# Patient Record
Sex: FEMALE | Race: WHITE | NOT HISPANIC OR LATINO
[De-identification: names, ages, dates, MRNs, and addresses within clinical notes are randomized per-mention and may not be internally consistent; named-entity substitution may affect disease eponyms.]

---

## 2016-11-17 RX ORDER — VERAPAMIL HCL 240 MG
1 CAPSULE, EXTENDED RELEASE PELLETS 24 HR ORAL
Qty: 0 | Refills: 0 | COMMUNITY
Start: 2016-11-17

## 2017-05-26 ENCOUNTER — APPOINTMENT (OUTPATIENT)
Dept: UROLOGY | Facility: CLINIC | Age: 82
End: 2017-05-26

## 2017-06-05 ENCOUNTER — APPOINTMENT (OUTPATIENT)
Dept: UROLOGY | Facility: CLINIC | Age: 82
End: 2017-06-05

## 2017-06-05 VITALS
HEART RATE: 69 BPM | RESPIRATION RATE: 17 BRPM | DIASTOLIC BLOOD PRESSURE: 76 MMHG | HEIGHT: 62 IN | BODY MASS INDEX: 30.36 KG/M2 | SYSTOLIC BLOOD PRESSURE: 120 MMHG | TEMPERATURE: 97.8 F | WEIGHT: 165 LBS

## 2017-06-05 DIAGNOSIS — Z78.9 OTHER SPECIFIED HEALTH STATUS: ICD-10-CM

## 2017-06-05 DIAGNOSIS — N81.3 COMPLETE UTEROVAGINAL PROLAPSE: ICD-10-CM

## 2017-07-24 ENCOUNTER — APPOINTMENT (OUTPATIENT)
Dept: OBGYN | Facility: CLINIC | Age: 82
End: 2017-07-24

## 2017-07-24 VITALS
HEIGHT: 62 IN | SYSTOLIC BLOOD PRESSURE: 120 MMHG | DIASTOLIC BLOOD PRESSURE: 70 MMHG | HEART RATE: 65 BPM | WEIGHT: 165 LBS | BODY MASS INDEX: 30.36 KG/M2

## 2017-07-24 DIAGNOSIS — R35.1 NOCTURIA: ICD-10-CM

## 2017-07-24 DIAGNOSIS — N95.2 POSTMENOPAUSAL ATROPHIC VAGINITIS: ICD-10-CM

## 2017-07-24 DIAGNOSIS — N39.46 MIXED INCONTINENCE: ICD-10-CM

## 2017-07-24 DIAGNOSIS — R32 UNSPECIFIED URINARY INCONTINENCE: ICD-10-CM

## 2017-07-24 DIAGNOSIS — N81.4 UTEROVAGINAL PROLAPSE, UNSPECIFIED: ICD-10-CM

## 2017-08-18 ENCOUNTER — APPOINTMENT (OUTPATIENT)
Dept: OBGYN | Facility: CLINIC | Age: 82
End: 2017-08-18

## 2017-10-18 ENCOUNTER — EMERGENCY (EMERGENCY)
Facility: HOSPITAL | Age: 82
LOS: 1 days | Discharge: HOME | End: 2017-10-18
Attending: EMERGENCY MEDICINE | Admitting: EMERGENCY MEDICINE
Payer: COMMERCIAL

## 2017-10-18 VITALS
SYSTOLIC BLOOD PRESSURE: 103 MMHG | RESPIRATION RATE: 14 BRPM | HEIGHT: 61 IN | OXYGEN SATURATION: 95 % | WEIGHT: 164.91 LBS | DIASTOLIC BLOOD PRESSURE: 58 MMHG | HEART RATE: 70 BPM | TEMPERATURE: 98 F

## 2017-10-18 VITALS
HEART RATE: 69 BPM | SYSTOLIC BLOOD PRESSURE: 140 MMHG | OXYGEN SATURATION: 96 % | DIASTOLIC BLOOD PRESSURE: 71 MMHG | RESPIRATION RATE: 15 BRPM | TEMPERATURE: 98 F

## 2017-10-18 DIAGNOSIS — E78.00 PURE HYPERCHOLESTEROLEMIA, UNSPECIFIED: ICD-10-CM

## 2017-10-18 DIAGNOSIS — I10 ESSENTIAL (PRIMARY) HYPERTENSION: ICD-10-CM

## 2017-10-18 DIAGNOSIS — R41.0 DISORIENTATION, UNSPECIFIED: ICD-10-CM

## 2017-10-18 DIAGNOSIS — N39.0 URINARY TRACT INFECTION, SITE NOT SPECIFIED: ICD-10-CM

## 2017-10-18 DIAGNOSIS — Z88.2 ALLERGY STATUS TO SULFONAMIDES: ICD-10-CM

## 2017-10-18 DIAGNOSIS — F03.90 UNSPECIFIED DEMENTIA WITHOUT BEHAVIORAL DISTURBANCE: ICD-10-CM

## 2017-10-18 LAB
ALBUMIN SERPL ELPH-MCNC: 3.3 G/DL — SIGNIFICANT CHANGE UP (ref 3.3–5)
ALP SERPL-CCNC: 73 U/L — SIGNIFICANT CHANGE UP (ref 40–120)
ALT FLD-CCNC: 13 U/L — SIGNIFICANT CHANGE UP (ref 12–78)
ANION GAP SERPL CALC-SCNC: 9 MMOL/L — SIGNIFICANT CHANGE UP (ref 5–17)
APPEARANCE UR: CLEAR — SIGNIFICANT CHANGE UP
APTT BLD: 29.8 SEC — SIGNIFICANT CHANGE UP (ref 27.5–37.4)
AST SERPL-CCNC: 22 U/L — SIGNIFICANT CHANGE UP (ref 15–37)
BACTERIA # UR AUTO: ABNORMAL
BASOPHILS # BLD AUTO: 0.1 K/UL — SIGNIFICANT CHANGE UP (ref 0–0.2)
BASOPHILS NFR BLD AUTO: 0.8 % — SIGNIFICANT CHANGE UP (ref 0–2)
BILIRUB SERPL-MCNC: 0.3 MG/DL — SIGNIFICANT CHANGE UP (ref 0.2–1.2)
BILIRUB UR-MCNC: NEGATIVE — SIGNIFICANT CHANGE UP
BUN SERPL-MCNC: 45 MG/DL — HIGH (ref 7–23)
CALCIUM SERPL-MCNC: 9.5 MG/DL — SIGNIFICANT CHANGE UP (ref 8.5–10.1)
CHLORIDE SERPL-SCNC: 104 MMOL/L — SIGNIFICANT CHANGE UP (ref 96–108)
CO2 SERPL-SCNC: 27 MMOL/L — SIGNIFICANT CHANGE UP (ref 22–31)
COLOR SPEC: YELLOW — SIGNIFICANT CHANGE UP
CREAT SERPL-MCNC: 1.9 MG/DL — HIGH (ref 0.5–1.3)
DIFF PNL FLD: ABNORMAL
EOSINOPHIL # BLD AUTO: 0.3 K/UL — SIGNIFICANT CHANGE UP (ref 0–0.5)
EOSINOPHIL NFR BLD AUTO: 2.6 % — SIGNIFICANT CHANGE UP (ref 0–6)
EPI CELLS # UR: SIGNIFICANT CHANGE UP
GLUCOSE SERPL-MCNC: 101 MG/DL — HIGH (ref 70–99)
GLUCOSE UR QL: NEGATIVE — SIGNIFICANT CHANGE UP
HCT VFR BLD CALC: 37.3 % — SIGNIFICANT CHANGE UP (ref 34.5–45)
HGB BLD-MCNC: 11.9 G/DL — SIGNIFICANT CHANGE UP (ref 11.5–15.5)
INR BLD: 1.04 RATIO — SIGNIFICANT CHANGE UP (ref 0.88–1.16)
KETONES UR-MCNC: NEGATIVE — SIGNIFICANT CHANGE UP
LACTATE SERPL-SCNC: 1.6 MMOL/L — SIGNIFICANT CHANGE UP (ref 0.7–2)
LEUKOCYTE ESTERASE UR-ACNC: ABNORMAL
LYMPHOCYTES # BLD AUTO: 2.9 K/UL — SIGNIFICANT CHANGE UP (ref 1–3.3)
LYMPHOCYTES # BLD AUTO: 27.1 % — SIGNIFICANT CHANGE UP (ref 13–44)
MCHC RBC-ENTMCNC: 30.8 PG — SIGNIFICANT CHANGE UP (ref 27–34)
MCHC RBC-ENTMCNC: 31.9 GM/DL — LOW (ref 32–36)
MCV RBC AUTO: 96.4 FL — SIGNIFICANT CHANGE UP (ref 80–100)
MONOCYTES # BLD AUTO: 1.2 K/UL — HIGH (ref 0–0.9)
MONOCYTES NFR BLD AUTO: 11.2 % — HIGH (ref 1–9)
NEUTROPHILS # BLD AUTO: 6.2 K/UL — SIGNIFICANT CHANGE UP (ref 1.8–7.4)
NEUTROPHILS NFR BLD AUTO: 58.3 % — SIGNIFICANT CHANGE UP (ref 43–77)
NITRITE UR-MCNC: NEGATIVE — SIGNIFICANT CHANGE UP
PH UR: 7 — SIGNIFICANT CHANGE UP (ref 5–8)
PLATELET # BLD AUTO: 312 K/UL — SIGNIFICANT CHANGE UP (ref 150–400)
POTASSIUM SERPL-MCNC: 4.4 MMOL/L — SIGNIFICANT CHANGE UP (ref 3.5–5.3)
POTASSIUM SERPL-SCNC: 4.4 MMOL/L — SIGNIFICANT CHANGE UP (ref 3.5–5.3)
PROT SERPL-MCNC: 7 G/DL — SIGNIFICANT CHANGE UP (ref 6–8.3)
PROT UR-MCNC: 25 MG/DL
PROTHROM AB SERPL-ACNC: 11.4 SEC — SIGNIFICANT CHANGE UP (ref 9.8–12.7)
RBC # BLD: 3.87 M/UL — SIGNIFICANT CHANGE UP (ref 3.8–5.2)
RBC # FLD: 13.9 % — SIGNIFICANT CHANGE UP (ref 10.3–14.5)
RBC CASTS # UR COMP ASSIST: SIGNIFICANT CHANGE UP /HPF (ref 0–4)
SODIUM SERPL-SCNC: 140 MMOL/L — SIGNIFICANT CHANGE UP (ref 135–145)
SP GR SPEC: 1.01 — SIGNIFICANT CHANGE UP (ref 1.01–1.02)
UROBILINOGEN FLD QL: NEGATIVE — SIGNIFICANT CHANGE UP
WBC # BLD: 10.6 K/UL — HIGH (ref 3.8–10.5)
WBC # FLD AUTO: 10.6 K/UL — HIGH (ref 3.8–10.5)
WBC UR QL: ABNORMAL

## 2017-10-18 PROCEDURE — 83605 ASSAY OF LACTIC ACID: CPT

## 2017-10-18 PROCEDURE — 99284 EMERGENCY DEPT VISIT MOD MDM: CPT | Mod: 25

## 2017-10-18 PROCEDURE — 85027 COMPLETE CBC AUTOMATED: CPT

## 2017-10-18 PROCEDURE — 80053 COMPREHEN METABOLIC PANEL: CPT

## 2017-10-18 PROCEDURE — 85730 THROMBOPLASTIN TIME PARTIAL: CPT

## 2017-10-18 PROCEDURE — 87150 DNA/RNA AMPLIFIED PROBE: CPT

## 2017-10-18 PROCEDURE — 93005 ELECTROCARDIOGRAM TRACING: CPT

## 2017-10-18 PROCEDURE — 96365 THER/PROPH/DIAG IV INF INIT: CPT

## 2017-10-18 PROCEDURE — 85610 PROTHROMBIN TIME: CPT

## 2017-10-18 PROCEDURE — 71045 X-RAY EXAM CHEST 1 VIEW: CPT

## 2017-10-18 PROCEDURE — 87040 BLOOD CULTURE FOR BACTERIA: CPT

## 2017-10-18 PROCEDURE — 87086 URINE CULTURE/COLONY COUNT: CPT

## 2017-10-18 PROCEDURE — 99285 EMERGENCY DEPT VISIT HI MDM: CPT

## 2017-10-18 PROCEDURE — 81001 URINALYSIS AUTO W/SCOPE: CPT

## 2017-10-18 PROCEDURE — 96361 HYDRATE IV INFUSION ADD-ON: CPT

## 2017-10-18 PROCEDURE — 87186 SC STD MICRODIL/AGAR DIL: CPT

## 2017-10-18 PROCEDURE — 71010: CPT | Mod: 26

## 2017-10-18 RX ORDER — CEFUROXIME AXETIL 250 MG
1 TABLET ORAL
Qty: 14 | Refills: 0 | OUTPATIENT
Start: 2017-10-18 | End: 2017-10-25

## 2017-10-18 RX ORDER — SODIUM CHLORIDE 9 MG/ML
1000 INJECTION INTRAMUSCULAR; INTRAVENOUS; SUBCUTANEOUS ONCE
Qty: 0 | Refills: 0 | Status: COMPLETED | OUTPATIENT
Start: 2017-10-18 | End: 2017-10-18

## 2017-10-18 RX ORDER — CEFTRIAXONE 500 MG/1
1 INJECTION, POWDER, FOR SOLUTION INTRAMUSCULAR; INTRAVENOUS ONCE
Qty: 0 | Refills: 0 | Status: COMPLETED | OUTPATIENT
Start: 2017-10-18 | End: 2017-10-18

## 2017-10-18 RX ADMIN — SODIUM CHLORIDE 1000 MILLILITER(S): 9 INJECTION INTRAMUSCULAR; INTRAVENOUS; SUBCUTANEOUS at 18:39

## 2017-10-18 RX ADMIN — SODIUM CHLORIDE 1000 MILLILITER(S): 9 INJECTION INTRAMUSCULAR; INTRAVENOUS; SUBCUTANEOUS at 16:30

## 2017-10-18 RX ADMIN — CEFTRIAXONE 100 GRAM(S): 500 INJECTION, POWDER, FOR SOLUTION INTRAMUSCULAR; INTRAVENOUS at 20:17

## 2017-10-18 NOTE — ED PROVIDER NOTE - OBJECTIVE STATEMENT
pt with hx dementia and baseline confusion bib daughter for increased confusion which pt usually has when uti. no fevers, vomiting, cough, diarrhea. pt unable to provide further history secondary to dementia.  pmd - [facility]

## 2017-10-18 NOTE — ED PROVIDER NOTE - CARE PLAN
Principal Discharge DX:	Confusion Principal Discharge DX:	Confusion  Secondary Diagnosis:	UTI (urinary tract infection)

## 2017-10-18 NOTE — ED ADULT NURSE NOTE - OBJECTIVE STATEMENT
patient came in ED with c/o increased confusion noted X yesterday. afebrile. non-labored respiration noted. abdomen nondistended, nontender. patient noted with prolapsed cervix.

## 2017-10-18 NOTE — ED PROVIDER NOTE - PROGRESS NOTE DETAILS
Reevaluated patient at bedside.  Patient feeling well.  Discussed the results of all diagnostic testing in ED and copies of all reports given to family, they declining admission, wants pt to be d/c home and f/u with pmd tomorrow and repeat labs as outpt.   An opportunity to ask questions was given.  Discussed the importance of prompt, close medical follow-up.  Patient will return with any changes, concerns or persistent / worsening symptoms.  Understanding of all instructions verbalized.

## 2017-10-18 NOTE — ED ADULT TRIAGE NOTE - CHIEF COMPLAINT QUOTE
"She has been more confused than usual."  pt has history of dementia, per daughter, confusion has been much worse the past few days, daughter states "usually this means she has a UTI", pt also more tired than usual

## 2017-10-18 NOTE — ED ADULT NURSE NOTE - CHPI ED SYMPTOMS NEG
no chills/no decreased eating/drinking/no dizziness/no fever/no numbness/no pain/no weakness/no vomiting/no nausea

## 2017-10-20 LAB
-  COAGULASE NEGATIVE STAPHYLOCOCCUS: SIGNIFICANT CHANGE UP
CULTURE RESULTS: SIGNIFICANT CHANGE UP
GRAM STN FLD: SIGNIFICANT CHANGE UP
GRAM STN FLD: SIGNIFICANT CHANGE UP
METHOD TYPE: SIGNIFICANT CHANGE UP
ORGANISM # SPEC MICROSCOPIC CNT: SIGNIFICANT CHANGE UP
ORGANISM # SPEC MICROSCOPIC CNT: SIGNIFICANT CHANGE UP
SPECIMEN SOURCE: SIGNIFICANT CHANGE UP

## 2017-10-20 NOTE — PROVIDER CONTACT NOTE (CRITICAL VALUE NOTIFICATION) - NAME OF MD/NP/PA/DO NOTIFIED:
Quality 358: Patient-Centered Surgical Risk Assessment And Communication: Documentation of patient-specific risk assessment with a risk calculator based on multi-institutional clinical data, the specific risk calculator used, and communication of risk assessment from risk calculator with the patient or family.
Detail Level: Generalized
Dr Zhou

## 2017-10-20 NOTE — PROVIDER CONTACT NOTE (CRITICAL VALUE NOTIFICATION) - ACTION/TREATMENT ORDERED:
As per Dr Zhou, patient went home on antibiotics and does not need to be called now, may be called in AM

## 2017-10-23 ENCOUNTER — INPATIENT (INPATIENT)
Facility: HOSPITAL | Age: 82
LOS: 3 days | Discharge: NURSING, REHAB OR LONG-TERM CARE | DRG: 371 | End: 2017-10-27
Attending: HOSPITALIST | Admitting: HOSPITALIST
Payer: COMMERCIAL

## 2017-10-23 VITALS
TEMPERATURE: 100 F | DIASTOLIC BLOOD PRESSURE: 71 MMHG | SYSTOLIC BLOOD PRESSURE: 102 MMHG | OXYGEN SATURATION: 96 % | RESPIRATION RATE: 17 BRPM | HEIGHT: 61 IN | WEIGHT: 164.91 LBS | HEART RATE: 73 BPM

## 2017-10-23 DIAGNOSIS — I10 ESSENTIAL (PRIMARY) HYPERTENSION: ICD-10-CM

## 2017-10-23 DIAGNOSIS — E78.5 HYPERLIPIDEMIA, UNSPECIFIED: ICD-10-CM

## 2017-10-23 DIAGNOSIS — K51.00 ULCERATIVE (CHRONIC) PANCOLITIS WITHOUT COMPLICATIONS: ICD-10-CM

## 2017-10-23 DIAGNOSIS — Z29.9 ENCOUNTER FOR PROPHYLACTIC MEASURES, UNSPECIFIED: ICD-10-CM

## 2017-10-23 DIAGNOSIS — N17.9 ACUTE KIDNEY FAILURE, UNSPECIFIED: ICD-10-CM

## 2017-10-23 DIAGNOSIS — N39.0 URINARY TRACT INFECTION, SITE NOT SPECIFIED: ICD-10-CM

## 2017-10-23 DIAGNOSIS — G30.9 ALZHEIMER'S DISEASE, UNSPECIFIED: ICD-10-CM

## 2017-10-23 DIAGNOSIS — R41.82 ALTERED MENTAL STATUS, UNSPECIFIED: ICD-10-CM
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
## 2017-10-23 LAB
ALBUMIN SERPL ELPH-MCNC: 2.6 G/DL — LOW (ref 3.3–5)
ALP SERPL-CCNC: 74 U/L — SIGNIFICANT CHANGE UP (ref 40–120)
ALT FLD-CCNC: 12 U/L — SIGNIFICANT CHANGE UP (ref 12–78)
AMYLASE P1 CFR SERPL: 22 U/L — LOW (ref 25–115)
ANION GAP SERPL CALC-SCNC: 7 MMOL/L — SIGNIFICANT CHANGE UP (ref 5–17)
APPEARANCE UR: ABNORMAL
AST SERPL-CCNC: 31 U/L — SIGNIFICANT CHANGE UP (ref 15–37)
BILIRUB SERPL-MCNC: 0.6 MG/DL — SIGNIFICANT CHANGE UP (ref 0.2–1.2)
BILIRUB UR-MCNC: NEGATIVE — SIGNIFICANT CHANGE UP
BUN SERPL-MCNC: 42 MG/DL — HIGH (ref 7–23)
CALCIUM SERPL-MCNC: 8.5 MG/DL — SIGNIFICANT CHANGE UP (ref 8.5–10.1)
CHLORIDE SERPL-SCNC: 103 MMOL/L — SIGNIFICANT CHANGE UP (ref 96–108)
CO2 SERPL-SCNC: 25 MMOL/L — SIGNIFICANT CHANGE UP (ref 22–31)
COLOR SPEC: YELLOW — SIGNIFICANT CHANGE UP
CREAT SERPL-MCNC: 1.9 MG/DL — HIGH (ref 0.5–1.3)
CULTURE RESULTS: SIGNIFICANT CHANGE UP
DIFF PNL FLD: ABNORMAL
GLUCOSE SERPL-MCNC: 106 MG/DL — HIGH (ref 70–99)
GLUCOSE UR QL: NEGATIVE — SIGNIFICANT CHANGE UP
HCT VFR BLD CALC: 36 % — SIGNIFICANT CHANGE UP (ref 34.5–45)
HGB BLD-MCNC: 11.5 G/DL — SIGNIFICANT CHANGE UP (ref 11.5–15.5)
KETONES UR-MCNC: NEGATIVE — SIGNIFICANT CHANGE UP
LACTATE SERPL-SCNC: 1.2 MMOL/L — SIGNIFICANT CHANGE UP (ref 0.7–2)
LEUKOCYTE ESTERASE UR-ACNC: ABNORMAL
LIDOCAIN IGE QN: 80 U/L — SIGNIFICANT CHANGE UP (ref 73–393)
LYMPHOCYTES # BLD AUTO: 10 % — LOW (ref 13–44)
MCHC RBC-ENTMCNC: 30.6 PG — SIGNIFICANT CHANGE UP (ref 27–34)
MCHC RBC-ENTMCNC: 32 GM/DL — SIGNIFICANT CHANGE UP (ref 32–36)
MCV RBC AUTO: 95.8 FL — SIGNIFICANT CHANGE UP (ref 80–100)
MONOCYTES NFR BLD AUTO: 13 % — HIGH (ref 1–9)
NEUTROPHILS NFR BLD AUTO: 76 % — SIGNIFICANT CHANGE UP (ref 43–77)
NITRITE UR-MCNC: NEGATIVE — SIGNIFICANT CHANGE UP
PH UR: 7 — SIGNIFICANT CHANGE UP (ref 5–8)
PLATELET # BLD AUTO: 283 K/UL — SIGNIFICANT CHANGE UP (ref 150–400)
POTASSIUM SERPL-MCNC: 3.5 MMOL/L — SIGNIFICANT CHANGE UP (ref 3.5–5.3)
POTASSIUM SERPL-SCNC: 3.5 MMOL/L — SIGNIFICANT CHANGE UP (ref 3.5–5.3)
PROT SERPL-MCNC: 6.6 G/DL — SIGNIFICANT CHANGE UP (ref 6–8.3)
PROT UR-MCNC: 75 MG/DL
RBC # BLD: 3.76 M/UL — LOW (ref 3.8–5.2)
RBC # FLD: 13.6 % — SIGNIFICANT CHANGE UP (ref 10.3–14.5)
SODIUM SERPL-SCNC: 135 MMOL/L — SIGNIFICANT CHANGE UP (ref 135–145)
SP GR SPEC: 1 — LOW (ref 1.01–1.02)
SPECIMEN SOURCE: SIGNIFICANT CHANGE UP
UROBILINOGEN FLD QL: NEGATIVE — SIGNIFICANT CHANGE UP
WBC # BLD: 25.4 K/UL — HIGH (ref 3.8–10.5)
WBC # FLD AUTO: 25.4 K/UL — HIGH (ref 3.8–10.5)

## 2017-10-23 PROCEDURE — 72170 X-RAY EXAM OF PELVIS: CPT | Mod: 26

## 2017-10-23 PROCEDURE — 71010: CPT | Mod: 26

## 2017-10-23 PROCEDURE — 99223 1ST HOSP IP/OBS HIGH 75: CPT | Mod: AI

## 2017-10-23 PROCEDURE — 93010 ELECTROCARDIOGRAM REPORT: CPT

## 2017-10-23 PROCEDURE — 74176 CT ABD & PELVIS W/O CONTRAST: CPT | Mod: 26

## 2017-10-23 PROCEDURE — 99281 EMR DPT VST MAYX REQ PHY/QHP: CPT

## 2017-10-23 PROCEDURE — 70450 CT HEAD/BRAIN W/O DYE: CPT | Mod: 26

## 2017-10-23 RX ORDER — SODIUM CHLORIDE 9 MG/ML
1000 INJECTION INTRAMUSCULAR; INTRAVENOUS; SUBCUTANEOUS
Qty: 0 | Refills: 0 | Status: DISCONTINUED | OUTPATIENT
Start: 2017-10-23 | End: 2017-10-27

## 2017-10-23 RX ORDER — ASPIRIN/CALCIUM CARB/MAGNESIUM 324 MG
81 TABLET ORAL DAILY
Qty: 0 | Refills: 0 | Status: DISCONTINUED | OUTPATIENT
Start: 2017-10-23 | End: 2017-10-27

## 2017-10-23 RX ORDER — SODIUM CHLORIDE 9 MG/ML
1000 INJECTION INTRAMUSCULAR; INTRAVENOUS; SUBCUTANEOUS ONCE
Qty: 0 | Refills: 0 | Status: COMPLETED | OUTPATIENT
Start: 2017-10-23 | End: 2017-10-23

## 2017-10-23 RX ORDER — LACTOBACILLUS ACIDOPHILUS 100MM CELL
1 CAPSULE ORAL DAILY
Qty: 0 | Refills: 0 | Status: DISCONTINUED | OUTPATIENT
Start: 2017-10-23 | End: 2017-10-27

## 2017-10-23 RX ORDER — VANCOMYCIN HCL 1 G
125 VIAL (EA) INTRAVENOUS EVERY 6 HOURS
Qty: 0 | Refills: 0 | Status: DISCONTINUED | OUTPATIENT
Start: 2017-10-23 | End: 2017-10-27

## 2017-10-23 RX ORDER — NITROFURANTOIN MACROCRYSTAL 50 MG
100 CAPSULE ORAL ONCE
Qty: 0 | Refills: 0 | Status: DISCONTINUED | OUTPATIENT
Start: 2017-10-23 | End: 2017-10-23

## 2017-10-23 RX ORDER — SIMVASTATIN 20 MG/1
20 TABLET, FILM COATED ORAL AT BEDTIME
Qty: 0 | Refills: 0 | Status: DISCONTINUED | OUTPATIENT
Start: 2017-10-23 | End: 2017-10-27

## 2017-10-23 RX ORDER — RISPERIDONE 4 MG/1
0.25 TABLET ORAL AT BEDTIME
Qty: 0 | Refills: 0 | Status: DISCONTINUED | OUTPATIENT
Start: 2017-10-23 | End: 2017-10-27

## 2017-10-23 RX ORDER — CIPROFLOXACIN LACTATE 400MG/40ML
200 VIAL (ML) INTRAVENOUS ONCE
Qty: 0 | Refills: 0 | Status: COMPLETED | OUTPATIENT
Start: 2017-10-23 | End: 2017-10-23

## 2017-10-23 RX ORDER — PIPERACILLIN AND TAZOBACTAM 4; .5 G/20ML; G/20ML
3.38 INJECTION, POWDER, LYOPHILIZED, FOR SOLUTION INTRAVENOUS ONCE
Qty: 0 | Refills: 0 | Status: COMPLETED | OUTPATIENT
Start: 2017-10-23 | End: 2017-10-23

## 2017-10-23 RX ORDER — PIPERACILLIN AND TAZOBACTAM 4; .5 G/20ML; G/20ML
3.38 INJECTION, POWDER, LYOPHILIZED, FOR SOLUTION INTRAVENOUS EVERY 8 HOURS
Qty: 0 | Refills: 0 | Status: DISCONTINUED | OUTPATIENT
Start: 2017-10-24 | End: 2017-10-24

## 2017-10-23 RX ORDER — ASPIRIN/CALCIUM CARB/MAGNESIUM 324 MG
81 TABLET ORAL DAILY
Qty: 0 | Refills: 0 | Status: DISCONTINUED | OUTPATIENT
Start: 2017-10-23 | End: 2017-10-23

## 2017-10-23 RX ORDER — SODIUM CHLORIDE 9 MG/ML
3 INJECTION INTRAMUSCULAR; INTRAVENOUS; SUBCUTANEOUS ONCE
Qty: 0 | Refills: 0 | Status: COMPLETED | OUTPATIENT
Start: 2017-10-23 | End: 2017-10-23

## 2017-10-23 RX ORDER — PANTOPRAZOLE SODIUM 20 MG/1
40 TABLET, DELAYED RELEASE ORAL
Qty: 0 | Refills: 0 | Status: DISCONTINUED | OUTPATIENT
Start: 2017-10-23 | End: 2017-10-24

## 2017-10-23 RX ORDER — ESCITALOPRAM OXALATE 10 MG/1
20 TABLET, FILM COATED ORAL DAILY
Qty: 0 | Refills: 0 | Status: DISCONTINUED | OUTPATIENT
Start: 2017-10-23 | End: 2017-10-27

## 2017-10-23 RX ORDER — METRONIDAZOLE 500 MG
500 TABLET ORAL ONCE
Qty: 0 | Refills: 0 | Status: COMPLETED | OUTPATIENT
Start: 2017-10-23 | End: 2017-10-23

## 2017-10-23 RX ORDER — HEPARIN SODIUM 5000 [USP'U]/ML
5000 INJECTION INTRAVENOUS; SUBCUTANEOUS EVERY 12 HOURS
Qty: 0 | Refills: 0 | Status: DISCONTINUED | OUTPATIENT
Start: 2017-10-23 | End: 2017-10-27

## 2017-10-23 RX ADMIN — PIPERACILLIN AND TAZOBACTAM 200 GRAM(S): 4; .5 INJECTION, POWDER, LYOPHILIZED, FOR SOLUTION INTRAVENOUS at 21:00

## 2017-10-23 RX ADMIN — Medication 100 MILLIGRAM(S): at 18:15

## 2017-10-23 RX ADMIN — SODIUM CHLORIDE 1000 MILLILITER(S): 9 INJECTION INTRAMUSCULAR; INTRAVENOUS; SUBCUTANEOUS at 15:12

## 2017-10-23 RX ADMIN — SODIUM CHLORIDE 1000 MILLILITER(S): 9 INJECTION INTRAMUSCULAR; INTRAVENOUS; SUBCUTANEOUS at 14:26

## 2017-10-23 RX ADMIN — Medication 100 MILLIGRAM(S): at 19:36

## 2017-10-23 RX ADMIN — SIMVASTATIN 20 MILLIGRAM(S): 20 TABLET, FILM COATED ORAL at 22:08

## 2017-10-23 RX ADMIN — SODIUM CHLORIDE 3 MILLILITER(S): 9 INJECTION INTRAMUSCULAR; INTRAVENOUS; SUBCUTANEOUS at 14:25

## 2017-10-23 NOTE — H&P ADULT - ASSESSMENT
86 y/o F with PMH of dementia, UTI's, cdif in 2016, bladder prolapse being treated for UTI p/w several episodes of diarrhea with fall with AMS, severe leukocytosis, AMS, NATALI, hypoalbuminemia, pancolitis on CT admitted for pancolitis, metabolic encephalopathy. 86 y/o F with PMH of dementia, HTN, HLD, UTI's, cdif in 2016, bladder prolapse being treated for UTI p/w several episodes of diarrhea with fall with AMS, severe leukocytosis with bandemia, elevated creatinine, hypoalbuminemia, pancolitis on CT admitted for pancolitis, NATALI, metabolic encephalopathy and fall workup.

## 2017-10-23 NOTE — H&P ADULT - PROBLEM SELECTOR PLAN 8
A and O x2, not agitated  c/w risperidone 0.25mg qhs (home med) A and O x2, not agitated  c/w risperidone 0.25mg qhs (home med)  c/w lexapro 20mg daily

## 2017-10-23 NOTE — H&P ADULT - PROBLEM SELECTOR PLAN 5
likely pre-renal in setting of UTI, colitis, diarrhea on diuretic at home  s/p 2L NS bolus in ER  c/w NS @ 60cc/hr  monitor electrolytes and repletes as needed likely pre-renal in setting of UTI, colitis, diarrhea on diuretic at home  s/p 2L NS bolus in ER  c/w NS @ 60cc/hr  monitor creatinine, electrolytes and repletes as needed

## 2017-10-23 NOTE — H&P ADULT - PROBLEM SELECTOR PLAN 4
-technically UTI should be resolved as been on PO ceftin x 5 days with coli only resistant to quinolones  -could not get clean UA or urine cx -technically UTI should be resolved as been on PO ceftin x 5 days with coli only resistant to quinolones  -UA with moderate LE  -zosyn will cover UTI  -f/u new urine cx -technically UTI should be resolved as been on PO ceftin x 5 days with Ecoli only resistant to quinolones  -UA with moderate LE, wbc, bacteria  -IV zosyn will cover UTI   -f/u new urine cx

## 2017-10-23 NOTE — H&P ADULT - HISTORY OF PRESENT ILLNESS
86 y/o F with PMH of dementia, UTI's, cdif in 2016, bladder prolapse p/w several epsidoes of diarrhea. Was recently seen on [facility] in ER on 10/18, diagnose with UTI, sent from ER with PO ceftin with urine cx  growing Ecoli resistant to quinolones.      ED vitals: 99.8, 73, 102/71, 17, 96% on Ra  She was given IV cipro 200mg, IV flagyl 500mg, and 2L NS bolus in ER Majority of Hx obtained from daughter    88 y/o F with PMH of dementia, UTI's, cdif in 2016, bladder prolapse currently being treated for UTI p/w several episodes of diarrhea x 2 days. Since yesterday, family reports >10 episodes of nonbloody,  watery diarrhea. No melena. Was recently seen on [facility] ER on 10/18, diagnosed with UTI, sent from ER with PO ceftin with urine cx growing Ecoli resistant to quinolones.  No reported fevers, vomiting. Associated with feeling weak and lethargy since last night as patient couldn't walk. Family reports mechanical fall last night (lives with her ) but currently and prior patient denies any pain anywhere.  Since arrival to ER, family reports patient is more arousalable and talkative compared to yesterday .No sick contacts or travel history. Pt denies any chest pain, sob, headache, palpitations, dysuria, cough.      ED vitals: 99.8, 73, 102/71, 17, 96% on Ra  She was given IV cipro 200mg, IV flagyl 500mg, and 2L NS bolus in ER Majority of Hx obtained from daughter    86 y/o F with PMH of dementia, HTN, HLD, UTI's, cdif in 2016, bladder prolapse currently being treated for UTI p/w several episodes of diarrhea x 2 days. Since yesterday, family reports >10 episodes of nonbloody,  watery diarrhea. No melena. Was recently seen on [facility] ER on 10/18, diagnosed with UTI, sent from ER with PO ceftin with urine cx growing Ecoli resistant to quinolones.  No reported fevers, vomiting. Associated with feeling weak and lethargy since last night as patient couldn't walk. Family reports mechanical fall last night (lives with her ) but currently and prior patient denies any pain anywhere.  Since arrival to ER, family reports patient is more arousalable and talkative compared to yesterday .No sick contacts or travel history. Pt denies any chest pain, sob, headache, palpitations, dysuria, cough.      ED vitals: 99.8, 73, 102/71, 17, 96% on Ra  She was given IV cipro 200mg, IV flagyl 500mg, and 2L NS bolus in ER

## 2017-10-23 NOTE — ED ADULT NURSE NOTE - OBJECTIVE STATEMENT
Pt. received alert and oriented x4 with chief complaint of increased AMS over last week w/ multiple bouts of diarrhea in last 24 hours. Pt. presents w/ stage 1 pressure ulcer to bilateral gluteal and sacral area. Pt. presents w/ prolapsed bladder.

## 2017-10-23 NOTE — H&P ADULT - PROBLEM SELECTOR PLAN 7
SBP in low 100's in setting of diarrhea to infection, dehydration, poor po intake  -will hold anti-htn at this time as, can restart as clinically indicated  -on diltizem 120mg ER, valsartan/hctz 160/25mg, and hydralazine 10mg qhs (on hold)

## 2017-10-23 NOTE — ED PROVIDER NOTE - OBJECTIVE STATEMENT
Pt is a 86 yo female who presents to the ED with worsening confusion.  PMHx of dementia, HLD, prolapsed bladder s/p failed surgical repair with no further operative management, HTN, h/o MDR UTI, h/o C.diff colitis Nov 2016.  Pt suffers from dementia and is unable to provide history.  Per family at bedside the week of 10/18 pt was noted to have increased confusion from baseline.  When this occurs pt normally has an infection.  She was seen in the ED and diagnosed with a UTI.  Pt was discharged home on po Cefuroxime.  Despite taking this medication pt continues to experience worsening confusion and last night developed diarrhea.  She is having multiple episodes of loose watery stools.  Today just before arrival to the ED pt had 5 episodes of watery diarrhea.  Family reports that this is non-bloody, non melanotic.  The family further reports that last night she was so weak she collapsed to the ground but she did not strike her head and there was not LOC.  Denies fever, chills. N/V, CP, SOB, abd pain.

## 2017-10-23 NOTE — H&P ADULT - PROBLEM SELECTOR PROBLEM 8
Need for prophylactic measure Alzheimer's dementia without behavioral disturbance, unspecified timing of dementia onset

## 2017-10-23 NOTE — ED PROVIDER NOTE - MEDICAL DECISION MAKING DETAILS
screening septic work up, EKG, chest x-ray, cardiac enzymes, CT abd/pelvis, C-diff, stool for culture, ova, parasite

## 2017-10-23 NOTE — H&P ADULT - PROBLEM SELECTOR PLAN 6
reported mechanical fall, no clear injury on exam, no pain per patient  will get xray of hip to r/o fracture  ct head r/o bleed  send ck levels to r/o rhabdo  pt eval  fall risk

## 2017-10-23 NOTE — ED PROVIDER NOTE - CARE PLAN
Principal Discharge DX:	Pancolitis  Instructions for follow-up, activity and diet:	admit  Secondary Diagnosis:	Bandemia  Secondary Diagnosis:	Renal insufficiency

## 2017-10-23 NOTE — ED PROVIDER NOTE - GENITOURINARY BLADDER
non-distended/non-tender/prolapsed bladder manual reduction attempted at bedside but once retracted into the vaginal canal pt would bear down and cause recurrence of prolapse

## 2017-10-23 NOTE — H&P ADULT - PROBLEM SELECTOR PLAN 1
CT with pancolitis in setting of frequent diarrhea, hx of cdif 1 years ago, currently on antibiotics for UTI, with leukocytosis highly suspicious for Cdif vs other infectious etiology  -given above risk factors will empirically treat for severe Cdif with PO vanc as meets criteria  -s/p cipro/flagyl in ER, will switch to zosyn for gram negative and anaerobes coverage  -monitor for diarrhea  -f/u cdif  -send stool cx, ova and parasites  -f/u blood cx CT with pancolitis in setting of frequent diarrhea, hx of cdif 1 years ago, currently on antibiotics for UTI, with leukocytosis highly suspicious for severe Cdif vs other infectious etiology  -given above risk factors will empirically treat for severe Cdif with PO vanc as meets criteria  -s/p cipro/flagyl in ER, will switch to zosyn for gram negative and anaerobes coverage  -monitor for diarrhea  -f/u cdif  -send stool cx, ova and parasites  -f/u blood cx  -ID consult with Dr Tirado

## 2017-10-23 NOTE — H&P ADULT - PROBLEM SELECTOR PROBLEM 6
Alzheimer's dementia without behavioral disturbance, unspecified timing of dementia onset Fall, initial encounter

## 2017-10-23 NOTE — H&P ADULT - NSHPLABSRESULTS_GEN_ALL_CORE
personally reviewed labs notable for:  WBC 25 from 10 (on 10/18)   N 80%, Bands 4%  Hgb 10.9  creatinine 1.9 (unclear baseline 1.0-1.5)  alb 2.6    urine cx 10/18:  ecoli resistant to quinolones  blood cx 10/18: coag deb olivas    personally reviewed ct abd/pelvis: IMPRESSION: Pancolitis. Renal cysts with a large upper pole cyst on the right    personally reviewed CXR: clear lungs personally reviewed labs notable for:  WBC 25 from 10 (on 10/18)   N 80%, Bands 4%  Hgb 10.9  creatinine 1.9 (unclear baseline 1.0-1.5)  alb 2.6  lactate 1.2    urine cx 10/18:  ecoli resistant to quinolones  blood cx 10/18: coag neg deisy    personally reviewed ct abd/pelvis: IMPRESSION: Pancolitis. Renal cysts with a large upper pole cyst on the right    personally reviewed CXR: clear lungs personally reviewed labs notable for:  WBC 25 from 10 (on 10/18)   N 80%, Bands 4%  Hgb 10.9  creatinine 1.9 (unclear baseline 1.0-1.5)  alb 2.6  lactate 1.2    UA: mod LE  urine cx 10/18:  ecoli resistant to quinolones  blood cx 10/18: coag neg staph    personally reviewed ct abd/pelvis: IMPRESSION: Pancolitis. Renal cysts with a large upper pole cyst on the right    personally reviewed CXR: clear lungs    personally reviewed EKG: sinus @ 62, pvcs, RBBB (old), TWI in I, II personally reviewed labs notable for:  WBC 25 from 10 (on 10/18)   N 80%, Bands 4%  Hgb 10.9  creatinine 1.9 (unclear baseline 1.0-1.5)  alb 2.6  lactate 1.2    UA: mod LE, 6-10wbc, mod bacteria  urine cx 10/18:  ecoli resistant to quinolones  blood cx 10/18: coag neg staph    personally reviewed ct abd/pelvis: IMPRESSION: Pancolitis. Renal cysts with a large upper pole cyst on the right    personally reviewed CXR: clear lungs    personally reviewed EKG: sinus @ 62, pvcs, RBBB (old), TWI in I, II

## 2017-10-23 NOTE — ED PROVIDER NOTE - PROGRESS NOTE DETAILS
Pt likely suffering form C-diff colitis given high WBC, multiple episodes of watery stool, pancolitis, and past history of C-diff.  Difficult to obtain clean urine given current bladder prolapse.  Will treat with Cipro flagyl to cover for C-diff and possible UTI.  Urine culture pending.  Family updated.  Plan is for admission and further work up

## 2017-10-23 NOTE — H&P ADULT - PROBLEM SELECTOR PLAN 2
high suspicion for severe cdif as hx of cdif currently on antibiotics with leukocytosis 25, NATALI, and hypoalbuminemia   -start PO vanc 125mg q6   -if cdif negative then d/c PO vanc  -contact isolation  -trend wbc  -ID consult with Dr Tirado

## 2017-10-24 DIAGNOSIS — E87.6 HYPOKALEMIA: ICD-10-CM

## 2017-10-24 DIAGNOSIS — A04.72 ENTEROCOLITIS DUE TO CLOSTRIDIUM DIFFICILE, NOT SPECIFIED AS RECURRENT: ICD-10-CM

## 2017-10-24 LAB
ANION GAP SERPL CALC-SCNC: 10 MMOL/L — SIGNIFICANT CHANGE UP (ref 5–17)
ANION GAP SERPL CALC-SCNC: 9 MMOL/L — SIGNIFICANT CHANGE UP (ref 5–17)
BUN SERPL-MCNC: 38 MG/DL — HIGH (ref 7–23)
BUN SERPL-MCNC: 43 MG/DL — HIGH (ref 7–23)
C DIFF BY PCR RESULT: DETECTED
C DIFF TOX GENS STL QL NAA+PROBE: SIGNIFICANT CHANGE UP
CALCIUM SERPL-MCNC: 8.2 MG/DL — LOW (ref 8.5–10.1)
CALCIUM SERPL-MCNC: 8.4 MG/DL — LOW (ref 8.5–10.1)
CHLORIDE SERPL-SCNC: 107 MMOL/L — SIGNIFICANT CHANGE UP (ref 96–108)
CHLORIDE SERPL-SCNC: 109 MMOL/L — HIGH (ref 96–108)
CK MB BLD-MCNC: 0.5 % — SIGNIFICANT CHANGE UP (ref 0–3.5)
CK MB CFR SERPL CALC: 2.4 NG/ML — SIGNIFICANT CHANGE UP (ref 0–3.6)
CK SERPL-CCNC: 452 U/L — HIGH (ref 26–192)
CO2 SERPL-SCNC: 22 MMOL/L — SIGNIFICANT CHANGE UP (ref 22–31)
CO2 SERPL-SCNC: 24 MMOL/L — SIGNIFICANT CHANGE UP (ref 22–31)
CREAT SERPL-MCNC: 1.6 MG/DL — HIGH (ref 0.5–1.3)
CREAT SERPL-MCNC: 1.7 MG/DL — HIGH (ref 0.5–1.3)
CULTURE RESULTS: SIGNIFICANT CHANGE UP
GLUCOSE SERPL-MCNC: 111 MG/DL — HIGH (ref 70–99)
GLUCOSE SERPL-MCNC: 86 MG/DL — SIGNIFICANT CHANGE UP (ref 70–99)
HCT VFR BLD CALC: 31 % — LOW (ref 34.5–45)
HGB BLD-MCNC: 9.8 G/DL — LOW (ref 11.5–15.5)
MAGNESIUM SERPL-MCNC: 1.2 MG/DL — LOW (ref 1.6–2.6)
MAGNESIUM SERPL-MCNC: 1.9 MG/DL — SIGNIFICANT CHANGE UP (ref 1.6–2.6)
MCHC RBC-ENTMCNC: 30 PG — SIGNIFICANT CHANGE UP (ref 27–34)
MCHC RBC-ENTMCNC: 31.5 GM/DL — LOW (ref 32–36)
MCV RBC AUTO: 95.1 FL — SIGNIFICANT CHANGE UP (ref 80–100)
PHOSPHATE SERPL-MCNC: 2.4 MG/DL — LOW (ref 2.5–4.5)
PHOSPHATE SERPL-MCNC: 3.3 MG/DL — SIGNIFICANT CHANGE UP (ref 2.5–4.5)
PLATELET # BLD AUTO: 238 K/UL — SIGNIFICANT CHANGE UP (ref 150–400)
POTASSIUM SERPL-MCNC: 2.8 MMOL/L — CRITICAL LOW (ref 3.5–5.3)
POTASSIUM SERPL-MCNC: 3.6 MMOL/L — SIGNIFICANT CHANGE UP (ref 3.5–5.3)
POTASSIUM SERPL-SCNC: 2.8 MMOL/L — CRITICAL LOW (ref 3.5–5.3)
POTASSIUM SERPL-SCNC: 3.6 MMOL/L — SIGNIFICANT CHANGE UP (ref 3.5–5.3)
RBC # BLD: 3.25 M/UL — LOW (ref 3.8–5.2)
RBC # FLD: 13.4 % — SIGNIFICANT CHANGE UP (ref 10.3–14.5)
SODIUM SERPL-SCNC: 140 MMOL/L — SIGNIFICANT CHANGE UP (ref 135–145)
SODIUM SERPL-SCNC: 141 MMOL/L — SIGNIFICANT CHANGE UP (ref 135–145)
SPECIMEN SOURCE: SIGNIFICANT CHANGE UP
TROPONIN I SERPL-MCNC: 0.02 NG/ML — SIGNIFICANT CHANGE UP (ref 0.01–0.04)
WBC # BLD: 17.9 K/UL — HIGH (ref 3.8–10.5)
WBC # FLD AUTO: 17.9 K/UL — HIGH (ref 3.8–10.5)

## 2017-10-24 PROCEDURE — 99233 SBSQ HOSP IP/OBS HIGH 50: CPT

## 2017-10-24 RX ORDER — POTASSIUM PHOSPHATE, MONOBASIC POTASSIUM PHOSPHATE, DIBASIC 236; 224 MG/ML; MG/ML
15 INJECTION, SOLUTION INTRAVENOUS ONCE
Qty: 0 | Refills: 0 | Status: COMPLETED | OUTPATIENT
Start: 2017-10-24 | End: 2017-10-24

## 2017-10-24 RX ORDER — METRONIDAZOLE 500 MG
500 TABLET ORAL EVERY 8 HOURS
Qty: 0 | Refills: 0 | Status: DISCONTINUED | OUTPATIENT
Start: 2017-10-24 | End: 2017-10-24

## 2017-10-24 RX ORDER — MAGNESIUM SULFATE 500 MG/ML
2 VIAL (ML) INJECTION ONCE
Qty: 0 | Refills: 0 | Status: COMPLETED | OUTPATIENT
Start: 2017-10-24 | End: 2017-10-24

## 2017-10-24 RX ORDER — CIPROFLOXACIN LACTATE 400MG/40ML
400 VIAL (ML) INTRAVENOUS EVERY 24 HOURS
Qty: 0 | Refills: 0 | Status: DISCONTINUED | OUTPATIENT
Start: 2017-10-24 | End: 2017-10-24

## 2017-10-24 RX ORDER — POTASSIUM CHLORIDE 20 MEQ
40 PACKET (EA) ORAL
Qty: 0 | Refills: 0 | Status: COMPLETED | OUTPATIENT
Start: 2017-10-24 | End: 2017-10-24

## 2017-10-24 RX ORDER — CIPROFLOXACIN LACTATE 400MG/40ML
200 VIAL (ML) INTRAVENOUS EVERY 24 HOURS
Qty: 0 | Refills: 0 | Status: DISCONTINUED | OUTPATIENT
Start: 2017-10-24 | End: 2017-10-24

## 2017-10-24 RX ADMIN — Medication 125 MILLIGRAM(S): at 18:54

## 2017-10-24 RX ADMIN — HEPARIN SODIUM 5000 UNIT(S): 5000 INJECTION INTRAVENOUS; SUBCUTANEOUS at 18:26

## 2017-10-24 RX ADMIN — Medication 125 MILLIGRAM(S): at 06:02

## 2017-10-24 RX ADMIN — PIPERACILLIN AND TAZOBACTAM 25 GRAM(S): 4; .5 INJECTION, POWDER, LYOPHILIZED, FOR SOLUTION INTRAVENOUS at 06:02

## 2017-10-24 RX ADMIN — Medication 125 MILLIGRAM(S): at 23:14

## 2017-10-24 RX ADMIN — POTASSIUM PHOSPHATE, MONOBASIC POTASSIUM PHOSPHATE, DIBASIC 62.5 MILLIMOLE(S): 236; 224 INJECTION, SOLUTION INTRAVENOUS at 09:36

## 2017-10-24 RX ADMIN — Medication 125 MILLIGRAM(S): at 02:03

## 2017-10-24 RX ADMIN — RISPERIDONE 0.25 MILLIGRAM(S): 4 TABLET ORAL at 20:42

## 2017-10-24 RX ADMIN — ESCITALOPRAM OXALATE 20 MILLIGRAM(S): 10 TABLET, FILM COATED ORAL at 12:10

## 2017-10-24 RX ADMIN — SODIUM CHLORIDE 60 MILLILITER(S): 9 INJECTION INTRAMUSCULAR; INTRAVENOUS; SUBCUTANEOUS at 02:04

## 2017-10-24 RX ADMIN — HEPARIN SODIUM 5000 UNIT(S): 5000 INJECTION INTRAVENOUS; SUBCUTANEOUS at 06:02

## 2017-10-24 RX ADMIN — PANTOPRAZOLE SODIUM 40 MILLIGRAM(S): 20 TABLET, DELAYED RELEASE ORAL at 06:02

## 2017-10-24 RX ADMIN — Medication 40 MILLIEQUIVALENT(S): at 08:28

## 2017-10-24 RX ADMIN — Medication 50 GRAM(S): at 09:35

## 2017-10-24 RX ADMIN — Medication 1 TABLET(S): at 12:10

## 2017-10-24 RX ADMIN — Medication 125 MILLIGRAM(S): at 12:17

## 2017-10-24 RX ADMIN — Medication 81 MILLIGRAM(S): at 12:10

## 2017-10-24 RX ADMIN — Medication 40 MILLIEQUIVALENT(S): at 09:43

## 2017-10-24 RX ADMIN — RISPERIDONE 0.25 MILLIGRAM(S): 4 TABLET ORAL at 02:04

## 2017-10-24 RX ADMIN — Medication 500 MILLIGRAM(S): at 10:07

## 2017-10-24 RX ADMIN — SIMVASTATIN 20 MILLIGRAM(S): 20 TABLET, FILM COATED ORAL at 23:14

## 2017-10-24 NOTE — PHYSICAL THERAPY INITIAL EVALUATION ADULT - PERTINENT HX OF CURRENT PROBLEM, REHAB EVAL
88 y/o female adm 10/23 with diarrhea. X-ray pelvis: negative. Pt was previously being treated for UTI. C-diff +

## 2017-10-24 NOTE — PROGRESS NOTE ADULT - PROBLEM SELECTOR PLAN 7
SBP in low 100's in setting of diarrhea to infection, dehydration, poor po intake  -will hold anti-htn at this time as, can restart as clinically indicated  -on diltizem 120mg ER, valsartan/hctz 160/25mg, and hydralazine 10mg qhs (on hold) -Hold antihypertensive medications for now.   -Continue to monitor vitals and kidney function  -Will restart home meds as needed

## 2017-10-24 NOTE — PROGRESS NOTE ADULT - PROBLEM SELECTOR PLAN 2
high suspicion for severe cdif as hx of cdif currently on antibiotics with leukocytosis 25, NATALI, and hypoalbuminemia   -start PO vanc 125mg q6   -if cdif negative then d/c PO vanc  -contact isolation  -trend wbc  -ID consult with Dr Tirado -Head CT negative for No acute intracranial hemorrhage or acute territorial infarct  -Possibly 2/2 to UTI and C. diff colitis. Continue antibiotics  -Fall precautions  -Continue to monitor

## 2017-10-24 NOTE — CONSULT NOTE ADULT - PROBLEM SELECTOR RECOMMENDATION 9
Stop Cipro  Stop Flagyl  Track stool output and consistency.  Avoid empiric antibiotics when able, now and in the future.  Discontinue unnecessary antibiotics.  No antimotility agents.  Avoid PPI and where antisecretory therapy is needed substitute alternate agents if possible.  Serial abdominal examinations.  Track WBC, serum creatinine, and electrolytes.  IV fluids as needed in the face of ongoing GI losses.  Replete electrolytes as needed.  Strict contact precautions. As the spores of C. difficile are widely disseminated (e.g. bed rails, drapes, beside table, TV remote) it is necessary to  gown and glove for all contact with the patient or the room environment. Precautions may be discontinued when the patient has clinically improved and the patient has had no diarrhea for 48 hours.  Upon exiting the and hygeine with soap and water is required rather than alcohol gel.  Once the patient has vacated the room, it must be terminally cleaned per infection control protocol.  "Test of cure" with PCR or other methodology during or after a course of treatment is not indicated. In the absence of active symptoms, a positive assay does not mean the patient has active disease, nor does it predict recurrence. Conversely, a negative assay does not preclude potential for recurrence.   Overall the risk of recurrence is around 20%. This typically occurs within 10 days of stopping antibiotics, but in the absence of superimposed precipitating factors recurrence present up to 10 weeks after the initial episode.  The benefit of priobiotics in active C. difficile is uncertain. The data is limited, with few studies sufficiently powered to determine any effect.  There may be some benefit in nonsevere, recurrent disease; but elsewhere the data is lacking (e.g. severe disease typically excluded from trials). In general probiotics are safe, though there are reports of bloodstream infection associated with the use of Lactobacillus or Sacchromyces boulardii.  Avoid PPO  No anti

## 2017-10-24 NOTE — PROGRESS NOTE ADULT - SUBJECTIVE AND OBJECTIVE BOX
Resident Progress Note    Patient is a 87y old  Female who presents with a chief complaint of diarrhea (23 Oct 2017 18:01)      HPI: Patient seen and examined at bedside. No acute events overnight. Denies abdominal pain, bowel movements, urinary symptoms. Admits to difficulty remembering. Patient has history of dementia    ROS:  Constitutional: denies fever, chills  Respiratory: denies SOB, COX, cough  Cardiovascular: denies CP, palpitations, edema  Gastrointestinal: denies nausea, vomiting, diarrhea, constipation, abdominal pain, melena  Genitourinary: denies dysuria, frequency, urgency, hematuria   Neurologic: denies headache, weakness, dizziness  ROS negative except as noted above    Vital Signs Last 24 Hrs  T(C): 37.4 (10-24-17 @ 07:51), Max: 37.7 (10-23-17 @ 15:17)  T(F): 99.4 (10-24-17 @ 07:51), Max: 99.8 (10-23-17 @ 15:17)  HR: 70 (10-24-17 @ 07:51) (68 - 75)  BP: 132/78 (10-24-17 @ 07:51) (102/71 - 136/86)  BP(mean): --  RR: 14 (10-24-17 @ 07:51) (14 - 18)  SpO2: 98% (10-24-17 @ 07:51) (96% - 99%)    Physical Exam:  General: Well developed, well nourished, NAD, non-cooperative with exam  HEENT: NCAT, PERRLA, EOMI bl, moist mucous membranes   Neurology: CN II-XII grossly intact  Respiratory: CTA B/L, No wheezes, rales, rhonchi  CV: RRR, +S1/S2, no murmurs, rubs or gallops  Abdominal: Soft, ND +BSx4, LLQ tenderness to palpation, no masses palpated  Extremities: No clubbing, cyanosis, edema, +1 peripheral pulses  Skin: warm, dry, normal color    LABS:                        9.8    17.9  )-----------( 238      ( 24 Oct 2017 06:28 )             31.0     24 Oct 2017 06:28    140    |  107    |  43     ----------------------------<  86     2.8     |  24     |  1.60     Ca    8.2        24 Oct 2017 06:28  Phos  2.4       24 Oct 2017 06:28  Mg     1.2       24 Oct 2017 06:28    TPro  6.6    /  Alb  2.6    /  TBili  0.6    /  DBili  x      /  AST  31     /  ALT  12     /  AlkPhos  74     23 Oct 2017 15:23      CARDIAC MARKERS ( 24 Oct 2017 06:28 )  .023 ng/mL / x     / 452 U/L / x     / 2.4 ng/mL  CARDIAC MARKERS ( 23 Oct 2017 15:23 )  .029 ng/mL / x     / 674 U/L / x     / 3.0 ng/mL      Urinalysis Basic - ( 23 Oct 2017 19:48 )    Color: Yellow / Appearance: Slightly Turbid / S.005 / pH: x  Gluc: x / Ketone: Negative  / Bili: Negative / Urobili: Negative   Blood: x / Protein: 75 mg/dL / Nitrite: Negative   Leuk Esterase: Moderate / RBC: 3-5 /HPF / WBC 6-10   Sq Epi: x / Non Sq Epi: Few / Bacteria: Few      CAPILLARY BLOOD GLUCOSE            RADIOLOGY & ADDITIONAL TESTS:    MEDICATIONS  (STANDING):  aspirin enteric coated 81 milliGRAM(s) Oral daily  ciprofloxacin   IVPB 400 milliGRAM(s) IV Intermittent every 24 hours  escitalopram 20 milliGRAM(s) Oral daily  heparin  Injectable 5000 Unit(s) SubCutaneous every 12 hours  lactobacillus acidophilus 1 Tablet(s) Oral daily  metroNIDAZOLE    Tablet 500 milliGRAM(s) Oral every 8 hours  simvastatin 20 milliGRAM(s) Oral at bedtime  sodium chloride 0.9%. 1000 milliLiter(s) (60 mL/Hr) IV Continuous <Continuous>  vancomycin    Solution 125 milliGRAM(s) Oral every 6 hours    MEDICATIONS  (PRN):  risperiDONE   Tablet 0.25 milliGRAM(s) Oral at bedtime PRN agitation      Allergies    sulfa drugs (Unknown) Resident Progress Note    Patient is a 87y old  Female who presents with a chief complaint of diarrhea (23 Oct 2017 18:01)      HPI: Patient seen and examined at bedside. No acute events overnight. Denies abdominal pain, bowel movements, urinary symptoms. Admits to difficulty remembering. Patient has history of dementia. Unreliable historian.     ROS:  Constitutional: denies fever, chills  Respiratory: denies SOB, COX, cough  Cardiovascular: denies CP, palpitations, edema  Gastrointestinal: denies nausea, vomiting, diarrhea, constipation, abdominal pain, melena  Genitourinary: denies dysuria, frequency, urgency, hematuria   Neurologic: denies headache, weakness, dizziness  ROS negative except as noted above    Vital Signs Last 24 Hrs  T(C): 37.4 (10-24-17 @ 07:51), Max: 37.7 (10-23-17 @ 15:17)  T(F): 99.4 (10-24-17 @ 07:51), Max: 99.8 (10-23-17 @ 15:17)  HR: 70 (10-24-17 @ 07:51) (68 - 75)  BP: 132/78 (10-24-17 @ 07:51) (102/71 - 136/86)  BP(mean): --  RR: 14 (10-24-17 @ 07:51) (14 - 18)  SpO2: 98% (10-24-17 @ 07:51) (96% - 99%)    Physical Exam:  General: Well developed, well nourished, NAD, non-cooperative with exam  HEENT: NCAT, PERRLA, EOMI bl, moist mucous membranes   Neurology: CN II-XII grossly intact  Respiratory: CTA B/L, No wheezes, rales, rhonchi  CV: RRR, +S1/S2, no murmurs, rubs or gallops  Abdominal: Soft, ND +BSx4, LLQ tenderness to palpation, no masses palpated  Extremities: No clubbing, cyanosis, edema, +1 peripheral pulses  Skin: warm, dry, normal color    LABS:                        9.8    17.9  )-----------( 238      ( 24 Oct 2017 06:28 )             31.0     24 Oct 2017 06:28    140    |  107    |  43     ----------------------------<  86     2.8     |  24     |  1.60     Ca    8.2        24 Oct 2017 06:28  Phos  2.4       24 Oct 2017 06:28  Mg     1.2       24 Oct 2017 06:28    TPro  6.6    /  Alb  2.6    /  TBili  0.6    /  DBili  x      /  AST  31     /  ALT  12     /  AlkPhos  74     23 Oct 2017 15:23      CARDIAC MARKERS ( 24 Oct 2017 06:28 )  .023 ng/mL / x     / 452 U/L / x     / 2.4 ng/mL  CARDIAC MARKERS ( 23 Oct 2017 15:23 )  .029 ng/mL / x     / 674 U/L / x     / 3.0 ng/mL      Urinalysis Basic - ( 23 Oct 2017 19:48 )    Color: Yellow / Appearance: Slightly Turbid / S.005 / pH: x  Gluc: x / Ketone: Negative  / Bili: Negative / Urobili: Negative   Blood: x / Protein: 75 mg/dL / Nitrite: Negative   Leuk Esterase: Moderate / RBC: 3-5 /HPF / WBC 6-10   Sq Epi: x / Non Sq Epi: Few / Bacteria: Few      CAPILLARY BLOOD GLUCOSE            RADIOLOGY & ADDITIONAL TESTS:    MEDICATIONS  (STANDING):  aspirin enteric coated 81 milliGRAM(s) Oral daily  ciprofloxacin   IVPB 400 milliGRAM(s) IV Intermittent every 24 hours  escitalopram 20 milliGRAM(s) Oral daily  heparin  Injectable 5000 Unit(s) SubCutaneous every 12 hours  lactobacillus acidophilus 1 Tablet(s) Oral daily  metroNIDAZOLE    Tablet 500 milliGRAM(s) Oral every 8 hours  simvastatin 20 milliGRAM(s) Oral at bedtime  sodium chloride 0.9%. 1000 milliLiter(s) (60 mL/Hr) IV Continuous <Continuous>  vancomycin    Solution 125 milliGRAM(s) Oral every 6 hours    MEDICATIONS  (PRN):  risperiDONE   Tablet 0.25 milliGRAM(s) Oral at bedtime PRN agitation      Allergies    sulfa drugs (Unknown)

## 2017-10-24 NOTE — PROGRESS NOTE ADULT - PROBLEM SELECTOR PLAN 8
A and O x2, not agitated  c/w risperidone 0.25mg qhs (home med)  c/w lexapro 20mg daily -Chronic, stable  -Continue risperidone and Lexapro

## 2017-10-24 NOTE — PROGRESS NOTE ADULT - PROBLEM SELECTOR PLAN 6
reported mechanical fall, no clear injury on exam, no pain per patient  will get xray of hip to r/o fracture  ct head r/o bleed  send ck levels to r/o rhabdo  pt eval  fall risk -Head CT negative for No acute intracranial hemorrhage or acute territorial infarct  -Fall risk protocol  -Follow up PT recommendations

## 2017-10-24 NOTE — PHYSICAL THERAPY INITIAL EVALUATION ADULT - ADDITIONAL COMMENTS
Pt lives with her spouse in a house, no steps. Has aide 7xwk from 8-4. Pt ambulates independently with RW and aide assists with ADLs. Information obtained from spouse at bedside.

## 2017-10-24 NOTE — ED ADULT NURSE REASSESSMENT NOTE - NS ED NURSE REASSESS COMMENT FT1
pt alert, confused, no n/v, no sob, lungs clear, abd soft, + sounds, diarrhea, iv infusing well, awaiting for med bed
pt alert, seen by Dr Forrester, aware of low potassium level, kcl po, tolerated well
pt aox2, no n/v, no sob, tolerating oral meds well, iv potassium , infusing well via iv pump, # 2 iv started, rt ac, magnesium ivpb infusing well
patient received from previous nurse, alert and oriented to person and place, plan of care and pending bed assignment discussed. IV fluid and Iv meds infusing. Will continue to monitor.
patient incontinent of stool and urine multiple times today, perineal care provided, patient made clean and barrier lotion applied. will continue to monitor.

## 2017-10-24 NOTE — CONSULT NOTE ADULT - ASSESSMENT
C. difficile colitis likely only active diagnosis presently  UTI- perhaps previously, extent of prior workup/circumstances unknown.   I don't see a role to treat infections other than C. difficile presently.  Typical markers for severe Clostridium difficile colitis include WBC > 15k, serum albumin <2.5-3.0, and/or a serum creatinine >1.5x baseline level, age > 60, or need for ICU admission.  Here renal insufficiency is of indeterminate age  Her WBC is improving  She does not have criteria for ICU admission presently

## 2017-10-24 NOTE — PROGRESS NOTE ADULT - PROBLEM SELECTOR PLAN 3
More awake since arriving to ER after IVH  likely due to metabolic encephalopathy due to colitis, UTI, dehydration, less likely intracranial pathology in setting of fall as no overt neuro deficiets  -get ct head r/o occult bleed given fall  -treat UTI, colitis, dehydration  -fall risk -Recently treated with po Ceftin x 5 days  -D/C Zosyn  -Start Cipro  -Follow up urine cx  -Follow up ID recommendations

## 2017-10-24 NOTE — PROGRESS NOTE ADULT - PROBLEM SELECTOR PLAN 4
-technically UTI should be resolved as been on PO ceftin x 5 days with Ecoli only resistant to quinolones  -UA with moderate LE, wbc, bacteria  -IV zosyn will cover UTI   -f/u new urine cx -Improved s/p IVF hydration  -Continue IVF  -Monitor labs

## 2017-10-24 NOTE — ED ADULT NURSE REASSESSMENT NOTE - COMFORT CARE
side rails up/repositioned/darkened lights/plan of care explained/po fluids offered/warm blanket provided

## 2017-10-24 NOTE — PROGRESS NOTE ADULT - PROBLEM SELECTOR PLAN 1
-CT showing pancolitis   -C. Diff antibody positive  -given above risk factors will empirically treat for severe Cdif with PO vanc as meets criteria  -s/p cipro/flagyl in ER, will switch to zosyn for gram negative and anaerobes coverage  -monitor for diarrhea  -f/u cdif  -send stool cx, ova and parasites  -f/u blood cx  -ID consult with Dr Tirado -CT showing pancolitis   -C. Diff toxin positive  -D/C Zosyn  -Start Cipro/Flagyl  -monitor diarrhea  -follow stool cx, ova and parasites  -f/u blood cx  -ID consult with Dr Tirado -CT showing pancolitis   -C. Diff toxin positive  -Contact precautions  -D/C Zosyn  -Start Cipro/Flagyl  -monitor diarrhea  -Follow up stool cx, ova and parasites  -Follow up blood cx  -Follow up ID consult, Dr. Lackey -CT showing pancolitis   -C. Diff toxin positive  -Contact precautions  -D/C Zosyn  -Start Cipro/Flagyl, continue po Vanco.   -monitor diarrhea  -Follow up stool cx, ova and parasites  -Follow up blood cx  -Follow up ID consult, Dr. Lackey

## 2017-10-24 NOTE — PROGRESS NOTE ADULT - PROBLEM SELECTOR PLAN 5
likely pre-renal in setting of UTI, colitis, diarrhea on diuretic at home  s/p 2L NS bolus in ER  c/w NS @ 60cc/hr  monitor creatinine, electrolytes and repletes as needed -K 2.8, Mg 1.2, Phos 2.4  -Replete with KCl 40 mEq x 2 doses  -Replete with magnesium sulfate IV 2 g and potassium phosphate 15 mMol  -Follow up BMP

## 2017-10-25 ENCOUNTER — TRANSCRIPTION ENCOUNTER (OUTPATIENT)
Age: 82
End: 2017-10-25

## 2017-10-25 DIAGNOSIS — F03.90 UNSPECIFIED DEMENTIA WITHOUT BEHAVIORAL DISTURBANCE: ICD-10-CM

## 2017-10-25 DIAGNOSIS — N39.0 URINARY TRACT INFECTION, SITE NOT SPECIFIED: ICD-10-CM

## 2017-10-25 LAB
ANION GAP SERPL CALC-SCNC: 9 MMOL/L — SIGNIFICANT CHANGE UP (ref 5–17)
BUN SERPL-MCNC: 30 MG/DL — HIGH (ref 7–23)
CALCIUM SERPL-MCNC: 8.5 MG/DL — SIGNIFICANT CHANGE UP (ref 8.5–10.1)
CHLORIDE SERPL-SCNC: 112 MMOL/L — HIGH (ref 96–108)
CO2 SERPL-SCNC: 22 MMOL/L — SIGNIFICANT CHANGE UP (ref 22–31)
CREAT SERPL-MCNC: 1.3 MG/DL — SIGNIFICANT CHANGE UP (ref 0.5–1.3)
CULTURE RESULTS: SIGNIFICANT CHANGE UP
GLUCOSE SERPL-MCNC: 80 MG/DL — SIGNIFICANT CHANGE UP (ref 70–99)
HCT VFR BLD CALC: 34 % — LOW (ref 34.5–45)
HGB BLD-MCNC: 10.9 G/DL — LOW (ref 11.5–15.5)
MAGNESIUM SERPL-MCNC: 1.7 MG/DL — SIGNIFICANT CHANGE UP (ref 1.6–2.6)
MCHC RBC-ENTMCNC: 30.5 PG — SIGNIFICANT CHANGE UP (ref 27–34)
MCHC RBC-ENTMCNC: 32 GM/DL — SIGNIFICANT CHANGE UP (ref 32–36)
MCV RBC AUTO: 95.3 FL — SIGNIFICANT CHANGE UP (ref 80–100)
PHOSPHATE SERPL-MCNC: 1.9 MG/DL — LOW (ref 2.5–4.5)
PLATELET # BLD AUTO: 255 K/UL — SIGNIFICANT CHANGE UP (ref 150–400)
POTASSIUM SERPL-MCNC: 3.1 MMOL/L — LOW (ref 3.5–5.3)
POTASSIUM SERPL-SCNC: 3.1 MMOL/L — LOW (ref 3.5–5.3)
RBC # BLD: 3.56 M/UL — LOW (ref 3.8–5.2)
RBC # FLD: 13.5 % — SIGNIFICANT CHANGE UP (ref 10.3–14.5)
SODIUM SERPL-SCNC: 143 MMOL/L — SIGNIFICANT CHANGE UP (ref 135–145)
SPECIMEN SOURCE: SIGNIFICANT CHANGE UP
WBC # BLD: 15.4 K/UL — HIGH (ref 3.8–10.5)
WBC # FLD AUTO: 15.4 K/UL — HIGH (ref 3.8–10.5)

## 2017-10-25 PROCEDURE — 99233 SBSQ HOSP IP/OBS HIGH 50: CPT

## 2017-10-25 RX ORDER — MAGNESIUM SULFATE 500 MG/ML
2 VIAL (ML) INJECTION ONCE
Qty: 0 | Refills: 0 | Status: COMPLETED | OUTPATIENT
Start: 2017-10-25 | End: 2017-10-25

## 2017-10-25 RX ORDER — POTASSIUM CHLORIDE 20 MEQ
40 PACKET (EA) ORAL EVERY 4 HOURS
Qty: 0 | Refills: 0 | Status: COMPLETED | OUTPATIENT
Start: 2017-10-25 | End: 2017-10-25

## 2017-10-25 RX ORDER — POTASSIUM PHOSPHATE, MONOBASIC POTASSIUM PHOSPHATE, DIBASIC 236; 224 MG/ML; MG/ML
15 INJECTION, SOLUTION INTRAVENOUS ONCE
Qty: 0 | Refills: 0 | Status: COMPLETED | OUTPATIENT
Start: 2017-10-25 | End: 2017-10-25

## 2017-10-25 RX ADMIN — HEPARIN SODIUM 5000 UNIT(S): 5000 INJECTION INTRAVENOUS; SUBCUTANEOUS at 05:13

## 2017-10-25 RX ADMIN — Medication 40 MILLIEQUIVALENT(S): at 11:48

## 2017-10-25 RX ADMIN — Medication 50 GRAM(S): at 14:52

## 2017-10-25 RX ADMIN — Medication 125 MILLIGRAM(S): at 05:13

## 2017-10-25 RX ADMIN — SIMVASTATIN 20 MILLIGRAM(S): 20 TABLET, FILM COATED ORAL at 22:50

## 2017-10-25 RX ADMIN — Medication 125 MILLIGRAM(S): at 11:48

## 2017-10-25 RX ADMIN — Medication 125 MILLIGRAM(S): at 17:28

## 2017-10-25 RX ADMIN — Medication 1 TABLET(S): at 11:48

## 2017-10-25 RX ADMIN — Medication 40 MILLIEQUIVALENT(S): at 08:12

## 2017-10-25 RX ADMIN — HEPARIN SODIUM 5000 UNIT(S): 5000 INJECTION INTRAVENOUS; SUBCUTANEOUS at 17:29

## 2017-10-25 RX ADMIN — Medication 81 MILLIGRAM(S): at 11:48

## 2017-10-25 RX ADMIN — POTASSIUM PHOSPHATE, MONOBASIC POTASSIUM PHOSPHATE, DIBASIC 62.5 MILLIMOLE(S): 236; 224 INJECTION, SOLUTION INTRAVENOUS at 09:36

## 2017-10-25 RX ADMIN — ESCITALOPRAM OXALATE 20 MILLIGRAM(S): 10 TABLET, FILM COATED ORAL at 11:48

## 2017-10-25 NOTE — DISCHARGE NOTE ADULT - SECONDARY DIAGNOSIS.
NATALI (acute kidney injury) Hypokalemia Altered mental status Fall, initial encounter Hypertension, unspecified type

## 2017-10-25 NOTE — DISCHARGE NOTE ADULT - HOSPITAL COURSE
88 y/o F with PMH of dementia, HTN, HLD, UTI's, cdif in 2016, bladder prolapse currently being treated for UTI p/w several episodes of diarrhea x 2 days. Since yesterday, family reports >10 episodes of nonbloody,  watery diarrhea. No melena. Was recently seen [facility] on 10/18, diagnosed with UTI, sent from ER with PO ceftin with urine cx growing Ecoli resistant to quinolones.  No reported fevers, vomiting. Associated with feeling weak and lethargy since last night as patient couldn't walk. Family reports mechanical fall last night (lives with her ) but currently and prior patient denies any pain anywhere.  Since arrival to ER, family reports patient is more arousalable and talkative compared to yesterday .No sick contacts or travel history. Pt denies any chest pain, sob, headache, palpitations, dysuria, cough.      ED vitals: 99.8, 73, 102/71, 17, 96% on Ra  She was given IV cipro 200mg, IV flagyl 500mg, and 2L NS bolus in ER 86 y/o F with PMH of dementia, HTN, HLD, UTI's, cdif in 2016, bladder prolapse currently being treated for UTI p/w several episodes of diarrhea x 2 days. SFamily reported >10 episodes of nonbloody,  watery diarrhea. No melena. Was recently seen [facility] on 10/18, diagnosed with UTI, sent from ER with PO ceftin with urine cx growing Ecoli resistant to quinolones.  No reported fevers, vomiting. The patient associated with feeling weak and lethargic since the night prior to admission as the patient couldn't walk. Family reported a mechanical fall the night prior to admission (lives with her ) but denied any pain anywhere.  Since arrival to ER, family reports patient is more arousable and talkative.No sick contacts or travel history. Pt denies any chest pain, sob, headache, palpitations, dysuria, cough.      ED vitals: 99.8, 73, 102/71, 17, 96% on RA  CT Head showed: No acute intracranial hemorrhage or acute territorial infarct. Chronic sinusitis is incidentally noted. Moderate ischemic small vessel white matter disease.  CT abdomen/pelvis showed pancolitis.   Patient was given IV cipro 200mg, IV flagyl 500mg, and 2L NS bolus in ER    The patient was admitted to the general medical floor for pancolitis. The patient was found to be positive for C. diff. ID, Dr. Lackey was consulted on the patient. The patient was continued on po vanco. The patient's watery bowel movements decreased. The patient was also found to have a positive UA with a negative UTI. As per ID, there was no indication for treatment. The patient was also found to have NATALI upon admission. This was likely 2/2 to dehydration from the pancolitis. Her NATALI was improved after gentle IV hydration. As a result of the diarrhea, the patient was also found to have hypokalemia, hypomagnesemia, and hypophosphatemia. Her electrolytes were repleted and her BMP, Mg, and Phos were monitored daily. The patient was also found to have AMS upon admission. The patient has a history of dementia, and her mental status change was likely 2/2 to her pancolitis. The patient has a history of hypertension. Her home meds were held due to NATALI. Her vitals have been stable during her hospitalization. She will resume her home medications and follow up with her PMD for further management. Physical therapy was consulted due the patient's recent fall. CT head was negative. PT recommended possible [facility]IS. The patient is currently medically stable for discharge and will follow up with her PMD outpatient for further management. 86 y/o F with PMH of dementia, HTN, HLD, UTI's, cdif in 2016, bladder prolapse currently being treated for UTI p/w several episodes of diarrhea x 2 days. Family reported >10 episodes of nonbloody,  watery diarrhea. No melena. Was recently seen on [facility] ER on 10/18, diagnosed with UTI, sent from ER with PO ceftin with urine cx growing Ecoli resistant to quinolones.  No reported fevers, vomiting. The patient associated with feeling weak and lethargic since the night prior to admission as the patient couldn't walk. Family reported a mechanical fall the night prior to admission (lives with her ) but denied any pain anywhere.  Since arrival to ER, family reports patient is more arousable and talkative.No sick contacts or travel history. Pt denied any chest pain, sob, headache, palpitations, dysuria, cough.      ED vitals: 99.8, 73, 102/71, 17, 96% on RA  CT Head showed: No acute intracranial hemorrhage or acute territorial infarct. Chronic sinusitis is incidentally noted. Moderate ischemic small vessel white matter disease.  CT abdomen/pelvis showed pancolitis.   Patient was given IV cipro 200mg, IV flagyl 500mg, and 2L NS bolus in ER    The patient was admitted to the general medical floor for pancolitis. The patient was found to be positive for C. diff. ID, Dr. Lackey was consulted on the patient. The patient was continued on po vanco. The patient's watery bowel movements decreased. The patient was also found to have a positive UA with a negative UTI. As per ID, there was no indication for treatment. The patient was also found to have NATALI upon admission. This was likely 2/2 to dehydration from the pancolitis. Her NATALI was improved after gentle IV hydration. As a result of the diarrhea, the patient was also found to have hypokalemia, hypomagnesemia, and hypophosphatemia. Her electrolytes were repleted and her BMP, Mg, and Phos were monitored daily. The patient was also found to have AMS upon admission. The patient has a history of dementia, and her mental status change was likely 2/2 to her pancolitis. The patient has a history of hypertension. Her home meds were held due to initial low blood pressure readings. These have since been restarted and her vitals have been stable during her hospitalization. She will resume her home medications and follow up with her PMD for further management. Physical therapy was consulted due the patient's recent fall. CT head was negative. PT recommended possible CHARIS. The patient is currently medically stable for discharge and will follow up with her PMD outpatient for further management. 86 y/o F with PMH of dementia, HTN, HLD, UTI's, cdif in 2016, bladder prolapse currently being treated for UTI p/w several episodes of diarrhea x 2 days. Family reported >10 episodes of nonbloody,  watery diarrhea. No melena. Was recently seen on [facility] ER on 10/18, diagnosed with UTI, sent from ER with PO ceftin with urine cx growing Ecoli resistant to quinolones.  No reported fevers, vomiting. The patient associated with feeling weak and lethargic since the night prior to admission as the patient couldn't walk. Family reported a mechanical fall the night prior to admission (lives with her ) but denied any pain anywhere.  Since arrival to ER, family reports patient is more arousable and talkative.No sick contacts or travel history. Pt denied any chest pain, sob, headache, palpitations, dysuria, cough.      ED vitals: 99.8, 73, 102/71, 17, 96% on RA  CT Head showed: No acute intracranial hemorrhage or acute territorial infarct. Chronic sinusitis is incidentally noted. Moderate ischemic small vessel white matter disease.  CT abdomen/pelvis showed pancolitis.   Patient was given IV cipro 200mg, IV flagyl 500mg, and 2L NS bolus in ER    The patient was admitted to the general medical floor for pancolitis. The patient was found to be positive for C. diff. ID, Dr. Lackey was consulted on the patient. The patient was continued on po vanco. The patient's watery bowel movements decreased. The patient was also found to have a positive UA with a negative UTI. As per ID, there was no indication for treatment. The patient was also found to have NATALI upon admission. This was likely 2/2 to dehydration from the pancolitis. Her NATALI was improved after gentle IV hydration. As a result of the diarrhea, the patient was also found to have hypokalemia, hypomagnesemia, and hypophosphatemia. Her electrolytes were repleted and her BMP, Mg, and Phos were monitored daily. The patient was also found to have AMS upon admission. The patient has a history of dementia, and her mental status change was likely 2/2 to her pancolitis. The patient has a history of hypertension. Her home meds were held due to initial low blood pressure readings. These have since been restarted and her vitals have been stable during her hospitalization. She will resume her home medications and follow up with her PMD for further management. Physical therapy was consulted due the patient's recent fall. CT head was negative. PT recommended [facility]. The patient is currently medically stable for discharge to [facility] and will follow up with her PMD outpatient for further management.

## 2017-10-25 NOTE — DISCHARGE NOTE ADULT - PLAN OF CARE
[facility] You were found to have an infection in your bowels when you arrived at the hospital. You were also found to be positive for C. diff which you have a history of. You have been given antibiotics and your diarrhea has since decreased. Please follow up with your PMD at the [facility] upon discharge for further management. If symptoms of diarrhea return and worsen, please return to the emergency room. Upon admission to the hospital, you were found to have some kidney injury. This is likely due to dehydration and has since improved with IV fluid hydration. Please keep hydrated. Follow up with your primary care doctor for further management. You were found to have low potassium, magnesium, and phosphorus levels during your hospital stay. This is likely secondary to your diarrhea. You electrolytes were repleted and your levels were monitored. Please follow up with your primary care doctor after discharge. You were brought to the hospital because you had a change in your mental status. This is likely due to the pancolitis. Your symptoms have since improved. Please follow up outpatient. On the day of admission, you had a fall. The CT of your head did not show a brain bleed. Physical therapy had been seeing you in the hospital and subacute rehab has been recommended. Please follow up after discharge. You have a history of high blood pressure. Your medications were not continued during your hospitalization because your blood pressure was well controlled during your stay. Please follow up with your primary care doctor for further management of these medications. You were found to have an infection in your bowels when you arrived at the hospital. You were also found to be positive for C. diff which you have a history of. You have been given antibiotics and your diarrhea, which has since decreased. Please follow up with your PMD at the [facility] upon discharge for further management. If symptoms of diarrhea return and worsen, please return to the emergency room. Upon admission to the hospital, you were found to have some kidney injury. This is likely due to dehydration and has since improved with IV fluid hydration. Please drink water and keep hydrated. Follow up with your primary care doctor for further management. You were found to have an infection in your bowels when you arrived at the hospital. You were also found to be positive for C. diff which you have a history of. You have been given antibiotics and your diarrhea, which has since decreased. Please follow up with your PMD at the [facility] upon discharge for further management. If symptoms of diarrhea return and worsen, please return to the emergency room. Please place contact precautions for the patient due to recent episode of C. diff and monitor bowel movements for diarrhea. Upon admission to the hospital, you were found to have some kidney injury. This is likely due to dehydration and has since improved with IV fluid hydration. Please drink water and keep hydrated. Follow up with your primary care doctor with a repeat BMP in 1 day to monitor electrolytes.

## 2017-10-25 NOTE — PROGRESS NOTE ADULT - SUBJECTIVE AND OBJECTIVE BOX
Resident Progress Note    Patient is a 87y old  Female who presents with a chief complaint of diarrhea x 1 wk (25 Oct 2017 00:09)      HPI: Patient seen and examined at bedside. No acute events overnight. Admits to abdominal discomfort. Does not know how many BM she has had. The patient is a poor historian. Denies dysuria. Tolerates po but states that she has had a decrease in appetite. As per RN, two BM observed. The patient is incontinent at baseline.     ROS:  Constitutional: denies fever   HEENT: Admits to difficulty hearing  Respiratory: Denies SOB  Cardiovascular: Denies chest pain, palpitations  Gastrointestinal: Denies nausea, vomiting; Admits to abdominal discomfort, admits to soft BM  Genitourinary: denies dysuria  Neurologic: denies headache, dizziness  ROS negative except as noted above    Vital Signs Last 24 Hrs  T(C): 36.7 (10-25-17 @ 05:42), Max: 37 (10-24-17 @ 10:12)  T(F): 98.1 (10-25-17 @ 05:42), Max: 98.6 (10-24-17 @ 10:12)  HR: 76 (10-25-17 @ 05:42) (71 - 84)  BP: 168/85 (10-25-17 @ 05:42) (123/68 - 168/85)  BP(mean): --  RR: 18 (10-25-17 @ 05:42) (14 - 18)  SpO2: 96% (10-25-17 @ 05:42) (95% - 98%)    Physical Exam:  General: Well developed,  NAD  HEENT: NCAT, moist mucous membranes     Neurology: A&Ox 1, CN II-XII grossly intact  Respiratory: CTA B/L, No wheezes, rales, or rhonchi  CV: RRR, +S1/S2, no murmurs, rubs or gallops  Abdominal: Soft, NT, ND +BSx4  Extremities: No clubbing, cyanosis, edema, +2 peripheral pulses  Skin: warm, dry, normal color    LABS:                        10.9   15.4  )-----------( 255      ( 25 Oct 2017 06:42 )             34.0     25 Oct 2017 06:42    143    |  112    |  30     ----------------------------<  80     3.1     |  22     |  1.30     Ca    8.5        25 Oct 2017 06:42  Phos  1.9       25 Oct 2017 06:42  Mg     1.7       25 Oct 2017 06:42        CARDIAC MARKERS ( 24 Oct 2017 06:28 )  .023 ng/mL / x     / 452 U/L / x     / 2.4 ng/mL  CARDIAC MARKERS ( 23 Oct 2017 15:23 )  .029 ng/mL / x     / 674 U/L / x     / 3.0 ng/mL      Urinalysis Basic - ( 23 Oct 2017 19:48 )    Color: Yellow / Appearance: Slightly Turbid / S.005 / pH: x  Gluc: x / Ketone: Negative  / Bili: Negative / Urobili: Negative   Blood: x / Protein: 75 mg/dL / Nitrite: Negative   Leuk Esterase: Moderate / RBC: 3-5 /HPF / WBC 6-10   Sq Epi: x / Non Sq Epi: Few / Bacteria: Few      CAPILLARY BLOOD GLUCOSE            RADIOLOGY & ADDITIONAL TESTS:    MEDICATIONS  (STANDING):  aspirin enteric coated 81 milliGRAM(s) Oral daily  escitalopram 20 milliGRAM(s) Oral daily  heparin  Injectable 5000 Unit(s) SubCutaneous every 12 hours  lactobacillus acidophilus 1 Tablet(s) Oral daily  potassium chloride   Powder 40 milliEquivalent(s) Oral every 4 hours  potassium phosphate IVPB 15 milliMole(s) IV Intermittent once  simvastatin 20 milliGRAM(s) Oral at bedtime  sodium chloride 0.9%. 1000 milliLiter(s) (60 mL/Hr) IV Continuous <Continuous>  vancomycin    Solution 125 milliGRAM(s) Oral every 6 hours    MEDICATIONS  (PRN):  [email or web address]   Tablet 0.25 milliGRAM(s) Oral at bedtime PRN agitation      Allergies    sulfa drugs (Unknown)    Intolerances

## 2017-10-25 NOTE — DISCHARGE NOTE ADULT - CARE PLAN
Principal Discharge DX:	Pancolitis  Secondary Diagnosis:	NATALI (acute kidney injury)  Secondary Diagnosis:	Hypokalemia  Secondary Diagnosis:	Altered mental status  Secondary Diagnosis:	Fall, initial encounter  Secondary Diagnosis:	Hypertension, unspecified type Principal Discharge DX:	Pancolitis  Goal:	Resolution  Instructions for follow-up, activity and diet:	You were found to have an infection in your bowels when you arrived at the hospital. You were also found to be positive for C. diff which you have a history of. You have been given antibiotics and your diarrhea has since decreased. Please follow up with your PMD at the [facility] upon discharge for further management. If symptoms of diarrhea return and worsen, please return to the emergency room.  Secondary Diagnosis:	NATALI (acute kidney injury)  Instructions for follow-up, activity and diet:	Upon admission to the hospital, you were found to have some kidney injury. This is likely due to dehydration and has since improved with IV fluid hydration. Please keep hydrated. Follow up with your primary care doctor for further management.  Secondary Diagnosis:	Hypokalemia  Instructions for follow-up, activity and diet:	You were found to have low potassium, magnesium, and phosphorus levels during your hospital stay. This is likely secondary to your diarrhea. You electrolytes were repleted and your levels were monitored. Please follow up with your primary care doctor after discharge.  Secondary Diagnosis:	Altered mental status  Instructions for follow-up, activity and diet:	You were brought to the hospital because you had a change in your mental status. This is likely due to the pancolitis. Your symptoms have since improved. Please follow up outpatient.  Secondary Diagnosis:	Fall, initial encounter  Instructions for follow-up, activity and diet:	On the day of admission, you had a fall. The CT of your head did not show a brain bleed. Physical therapy had been seeing you in the hospital and subacute rehab has been recommended. Please follow up after discharge.  Secondary Diagnosis:	Hypertension, unspecified type  Instructions for follow-up, activity and diet:	You have a history of high blood pressure. Your medications were not continued during your hospitalization because your blood pressure was well controlled during your stay. Please follow up with your primary care doctor for further management of these medications. Principal Discharge DX:	Pancolitis  Goal:	Resolution  Instructions for follow-up, activity and diet:	You were found to have an infection in your bowels when you arrived at the hospital. You were also found to be positive for C. diff which you have a history of. You have been given antibiotics and your diarrhea, which has since decreased. Please follow up with your PMD at the [facility] upon discharge for further management. If symptoms of diarrhea return and worsen, please return to the emergency room.  Secondary Diagnosis:	NATALI (acute kidney injury)  Instructions for follow-up, activity and diet:	Upon admission to the hospital, you were found to have some kidney injury. This is likely due to dehydration and has since improved with IV fluid hydration. Please drink water and keep hydrated. Follow up with your primary care doctor for further management.  Secondary Diagnosis:	Hypokalemia  Instructions for follow-up, activity and diet:	You were found to have low potassium, magnesium, and phosphorus levels during your hospital stay. This is likely secondary to your diarrhea. You electrolytes were repleted and your levels were monitored. Please follow up with your primary care doctor after discharge.  Secondary Diagnosis:	Altered mental status  Instructions for follow-up, activity and diet:	You were brought to the hospital because you had a change in your mental status. This is likely due to the pancolitis. Your symptoms have since improved. Please follow up outpatient.  Secondary Diagnosis:	Fall, initial encounter  Instructions for follow-up, activity and diet:	On the day of admission, you had a fall. The CT of your head did not show a brain bleed. Physical therapy had been seeing you in the hospital and subacute rehab has been recommended. Please follow up after discharge.  Secondary Diagnosis:	Hypertension, unspecified type  Instructions for follow-up, activity and diet:	You have a history of high blood pressure. Your medications were not continued during your hospitalization because your blood pressure was well controlled during your stay. Please follow up with your primary care doctor for further management of these medications. Principal Discharge DX:	Pancolitis  Goal:	Resolution  Instructions for follow-up, activity and diet:	You were found to have an infection in your bowels when you arrived at the hospital. You were also found to be positive for C. diff which you have a history of. You have been given antibiotics and your diarrhea, which has since decreased. Please follow up with your PMD at the [facility] upon discharge for further management. If symptoms of diarrhea return and worsen, please return to the emergency room. Please place contact precautions for the patient due to recent episode of C. diff and monitor bowel movements for diarrhea.  Secondary Diagnosis:	NATALI (acute kidney injury)  Instructions for follow-up, activity and diet:	Upon admission to the hospital, you were found to have some kidney injury. This is likely due to dehydration and has since improved with IV fluid hydration. Please drink water and keep hydrated. Follow up with your primary care doctor with a repeat BMP in 1 day to monitor electrolytes.  Secondary Diagnosis:	Hypokalemia  Instructions for follow-up, activity and diet:	You were found to have low potassium, magnesium, and phosphorus levels during your hospital stay. This is likely secondary to your diarrhea. You electrolytes were repleted and your levels were monitored. Please follow up with your primary care doctor after discharge.  Secondary Diagnosis:	Altered mental status  Instructions for follow-up, activity and diet:	You were brought to the hospital because you had a change in your mental status. This is likely due to the pancolitis. Your symptoms have since improved. Please follow up outpatient.  Secondary Diagnosis:	Fall, initial encounter  Instructions for follow-up, activity and diet:	On the day of admission, you had a fall. The CT of your head did not show a brain bleed. Physical therapy had been seeing you in the hospital and subacute rehab has been recommended. Please follow up after discharge.  Secondary Diagnosis:	Hypertension, unspecified type  Instructions for follow-up, activity and diet:	You have a history of high blood pressure. Your medications were not continued during your hospitalization because your blood pressure was well controlled during your stay. Please follow up with your primary care doctor for further management of these medications.

## 2017-10-25 NOTE — DISCHARGE NOTE ADULT - ADDITIONAL INSTRUCTIONS
Please continue a healthy diet.  Follow up with your primary care doctor at the [facility] within 5 days of discharge. Please continue a healthy diet.  Please continue to encourage water intake and keep hydrated.   Follow up with your PMD for a repeat BMP in 1 day to monitor electrolytes.   Please place contact precautions for the patient due to recent episode of C. diff and monitor bowel movements for diarrhea. Please continue a healthy diet.  Please continue to encourage water intake and keep hydrated.   Follow up with your PMD for a repeat BMP in 1 day to monitor electrolytes/ sodium level needs to be checked.    Please place contact precautions for the patient due to recent episode of C. diff and monitor bowel movements for diarrhea.

## 2017-10-25 NOTE — PROGRESS NOTE ADULT - PROBLEM SELECTOR PROBLEM 3
Amena Shah 89 02/14/17 admitted under observation with c/o N/V and cramping. Allergic to dilaudid. Pt has hx of trending up BP's. SVE closed thick and high. Dr Victoria Chol aware pt is on the unit.   Plan of care monitor the patient and cervical exam. Urinary tract infection without hematuria, site unspecified

## 2017-10-25 NOTE — SWALLOW BEDSIDE ASSESSMENT ADULT - SWALLOW EVAL: RECOMMENDED FEEDING/EATING TECHNIQUES
alternate food with liquid/crush medication (when feasible)/position upright (90 degrees)/no straws/allow for swallow between intakes/oral hygiene/small sips/bites

## 2017-10-25 NOTE — DISCHARGE NOTE ADULT - MEDICATION SUMMARY - MEDICATIONS TO TAKE
I will START or STAY ON the medications listed below when I get home from the hospital:    aspirin 81 mg oral delayed release tablet  -- 1 tab(s) by mouth once a day  -- Indication: For Hypertension, unspecified type    verapamil 120 mg/24 hours oral capsule, extended release  -- 1 cap(s) by mouth once a day  -- Indication: For Hypertension, unspecified type    Lexapro 20 mg oral tablet  -- 1 tab(s) by mouth once a day  -- Indication: For Depression    simvastatin 20 mg oral tablet  -- 1 tab(s) by mouth once a day (at bedtime)  -- Indication: For Hyperlipidemia, unspecified hyperlipidemia type    valsartan-hydroCHLOROthiazide 160mg-25mg oral tablet  -- 1 tab(s) by mouth once a day  -- Indication: For Hypertension, unspecified type    risperiDONE 0.25 mg oral tablet  -- 1 tab(s) by mouth once a day (at bedtime)  -- Indication: For Dementia    FIRST-Vancomycin 25 oral liquid  -- 5 milliliter(s) by mouth every 6 hours   -- Indication: For Pancolitis    lactobacillus acidophilus oral capsule  -- 1 cap(s) by mouth 3 times a day  -- Indication: For Probiotic    hydrALAZINE 10 mg oral tablet  -- 1 tab(s) by mouth 3 times a day  -- Indication: For Hypertension, unspecified type    Vitamin D3 1000 intl units oral tablet  -- 1 tab(s) by mouth once a day  -- Indication: For Vitamin I will START or STAY ON the medications listed below when I get home from the hospital:    aspirin 81 mg oral delayed release tablet  -- 1 tab(s) by mouth once a day  -- Indication: For Hypertension, unspecified type    verapamil 120 mg/24 hours oral capsule, extended release  -- 1 cap(s) by mouth once a day  -- Indication: For Hypertension, unspecified type    Lexapro 20 mg oral tablet  -- 1 tab(s) by mouth once a day  -- Indication: For Depression    simvastatin 20 mg oral tablet  -- 1 tab(s) by mouth once a day (at bedtime)  -- Indication: For Hyperlipidemia, unspecified hyperlipidemia type    valsartan-hydroCHLOROthiazide 160mg-25mg oral tablet  -- 1 tab(s) by mouth once a day  -- Indication: For Hypertension, unspecified type    risperiDONE 0.25 mg oral tablet  -- 1 tab(s) by mouth once a day (at bedtime)  -- Indication: For Dementia    FIRST-Vancomycin 25 oral liquid  -- 5 milliliter(s) by mouth every 6 hours   -- Indication: For Clostridium difficile diarrhea    lactobacillus acidophilus oral capsule  -- 1 cap(s) by mouth 3 times a day  -- Indication: For Probiotic    hydrALAZINE 10 mg oral tablet  -- 1 tab(s) by mouth 3 times a day  -- Indication: For Hypertension, unspecified type    Vitamin D3 1000 intl units oral tablet  -- 1 tab(s) by mouth once a day  -- Indication: For Vitamin

## 2017-10-25 NOTE — DIETITIAN INITIAL EVALUATION ADULT. - SIGNS/SYMPTOMS
as evidenced by abnormal swallow study dx of dysphagia as evidenced by dx pancolitis with c-diff with diarrhea

## 2017-10-25 NOTE — SWALLOW BEDSIDE ASSESSMENT ADULT - ASR SWALLOW ASPIRATION MONITOR
upper respiratory infection/gurgly voice/pneumonia/change of breathing pattern/position upright (90Y)/cough/throat clearing/oral hygiene/fever

## 2017-10-25 NOTE — PROGRESS NOTE ADULT - PROBLEM SELECTOR PLAN 5
-K 3.1, Mg 1.7, Phos 1.9  -Replete with KCl 40 mEq x 2 doses  -Replete with magnesium sulfate IV 2 g and potassium phosphate 15 mMol  -Follow up BMP

## 2017-10-25 NOTE — PROGRESS NOTE ADULT - PROBLEM SELECTOR PLAN 6
-Head CT negative for No acute intracranial hemorrhage or acute territorial infarct  -Fall risk protocol  -PT recommendations appreciated. Recommendations to be determined. Possible CHARIS

## 2017-10-25 NOTE — DISCHARGE NOTE ADULT - MEDICATION SUMMARY - MEDICATIONS TO CHANGE
I will SWITCH the dose or number of times a day I take the medications listed below when I get home from the hospital:    hydrALAZINE 10 mg oral tablet  -- 10 milligram(s) by mouth once a day (at bedtime)

## 2017-10-25 NOTE — DIETITIAN INITIAL EVALUATION ADULT. - OTHER INFO
patient seen on puree diet noted seen by speech swallow rx [facility] soft dysphagia 2 thin liquid diet today. unsure of weight per family bed scale 177#. willing to try supplement. per RN 1 BM today noted 2 overnight. family reports weight stable regular consistency diet at home.

## 2017-10-25 NOTE — PROGRESS NOTE ADULT - PROBLEM SELECTOR PLAN 2
-Head CT negative for No acute intracranial hemorrhage or acute territorial infarct  -Possibly 2/2 to C. diff colitis.  -Fall precautions  -Continue to monitor -Head CT negative for No acute intracranial hemorrhage or acute territorial infarct  -Possibly 2/2 to C. diff colitis. causing encephalopathy   -Fall precautions  -Continue to monitor

## 2017-10-25 NOTE — SWALLOW BEDSIDE ASSESSMENT ADULT - COMMENTS
Pt alert, oriented to person, disoriented to time, place, situation.  Pt admitted with pancolitis, UTI.  Pt presents with oropharyngeal dysphagia characterized by adequate oral prep, adequate formation of the bolus, prolonged mastication, timely AP transport, swallow delay h/e sufficient enough to protect airway. No overt s/s of aspiration noted.  Pt safely tolerating mechanical soft consistencies with thin liquids.  Discussed with RN & MD.

## 2017-10-25 NOTE — DISCHARGE NOTE ADULT - NS AS ACTIVITY OBS
Do not make important decisions/Do not drive or operate machinery/Bathing allowed/No Heavy lifting/straining/Walking-Outdoors allowed/Walking-Indoors allowed

## 2017-10-25 NOTE — DIETITIAN INITIAL EVALUATION ADULT. - PROBLEM SELECTOR PLAN 1
CT with pancolitis in setting of frequent diarrhea, hx of cdif 1 years ago, currently on antibiotics for UTI, with leukocytosis highly suspicious for severe Cdif vs other infectious etiology  -given above risk factors will empirically treat for severe Cdif with PO vanc as meets criteria  -s/p cipro/flagyl in ER, will switch to zosyn for gram negative and anaerobes coverage  -monitor for diarrhea  -f/u cdif  -send stool cx, ova and parasites  -f/u blood cx  -ID consult with Dr Tirado

## 2017-10-25 NOTE — PROGRESS NOTE ADULT - PROBLEM SELECTOR PLAN 1
-2 BM noted overnight  -CT showing pancolitis   -C. Diff toxin positive  -Contact precautions  -ID consult, Dr. Lackey, recommendations appreciated  -Continue po Vanco.   -Follow up stool cx, ova and parasites  -Blood cx showed no growth to date

## 2017-10-25 NOTE — DIETITIAN INITIAL EVALUATION ADULT. - NS AS NUTRI INTERV MEALS SNACK
Texture-modified diet/Mineral - modified diet/suggest dysphagia 2 [facility] soft diet low Na

## 2017-10-25 NOTE — DIETITIAN INITIAL EVALUATION ADULT. - PROBLEM SELECTOR PLAN 5
likely pre-renal in setting of UTI, colitis, diarrhea on diuretic at home  s/p 2L NS bolus in ER  c/w NS @ 60cc/hr  monitor creatinine, electrolytes and repletes as needed

## 2017-10-25 NOTE — PROGRESS NOTE ADULT - SUBJECTIVE AND OBJECTIVE BOX
[facility], Division of Infectious Diseases  EDDIE Almaraz A. Lee    Name: MORITZ, JANE  Age: 87y  Gender: Female  MRN: [id number]    Interval History--  Notes reviewed. Patient remains oriented to self only, thinks she is in [place] and that she is 35 years old.   Discussed with RN, 1 diarrheal BM since 7am; prior one at 4am.    Past Medical History--  Dementia  High cholesterol  UTI (urinary tract infection)  HTN (hypertension)  No significant past surgical history      For details regarding the patient's social history, family history, and other miscellaneous elements, please refer the initial infectious diseases consultation and/or the admitting history and physical examination for this admission.    Allergies    sulfa drugs (Unknown)    Intolerances        Medications--  Antibiotics:  vancomycin    Solution 125 milliGRAM(s) Oral every 6 hours    Immunologic:    Other:  aspirin enteric coated  escitalopram  heparin  Injectable  lactobacillus acidophilus  magnesium sulfate  IVPB  risperiDONE   Tablet PRN  simvastatin  sodium chloride 0.9%.      Review of Systems--  Review of systems unable due to dementia.      Physical Examination--  Vital Signs: T(F): 98.1 (10-25-17 @ 05:42), Max: 98.3 (10-24-17 @ 21:16)  HR: 76 (10-25-17 @ 05:42)  BP: 168/85 (10-25-17 @ 05:42)  RR: 18 (10-25-17 @ 05:42)  SpO2: 96% (10-25-17 @ 05:42)  Wt(kg): --  General: Nontoxic-appearing Female in no acute distress.  HEENT: AT/NC. Limited, surgical pupils?. EOMI. Anicteric. Conjunctiva pink and moist. Oropharynx clear. Dentition fair.  Neck: Not rigid. No sense of mass.  Nodes: None palpable.  Lungs: Clear bilaterally without rales, wheezing or rhonchi  Heart: Regular rate and rhythm. I-II/VI Murmur. No rub. No gallop. No palpable thrill.  Abdomen: Bowel sounds present and normoactive. Soft. Mildly distended. No tenderness, nor guarding or rebound. No sense of mass. No organomegaly.  Back: No spinal tenderness. No costovertebral angle tenderness.   Extremities: No cyanosis or clubbing. No edema.   Skin: Warm. Dry. Good turgor. No rash. No vasculitic stigmata.  Psychiatric: Appropriate affect and mood for situation, though again disoriented.       Laboratory Studies--  CBC                        10.9   15.4  )-----------( 255      ( 25 Oct 2017 06:42 )             34.0   WBC Count: 17.9 K/uL (10.24.17 @ 06:28)  WBC Count: 23.4 K/uL (10.23.17 @ 16:17)        Chemistries  10-25    143  |  112<H>  |  30<H>  ----------------------------<  80  3.1<L>   |  22  |  1.30    Creatinine, Serum: 1.70 mg/dL (10.24.17 @ 14:09)  Creatinine, Serum: 1.60 mg/dL (10.24.17 @ 06:28)  Creatinine, Serum: 0.90 mg/dL (11.17.16 @ 07:23)      Ca    8.5      25 Oct 2017 06:42  Phos  1.9     10-25  Mg     1.7     10-25    TPro  6.6  /  Alb  2.6<L>  /  TBili  0.6  /  DBili  x   /  AST  31  /  ALT  12  /  AlkPhos  74  10-23      Culture Data  Culture - Stool (10.24.17 @ 01:32)    Specimen Source: .Stool Feces    Culture Results:   No enteric pathogens to date: Final culture pending    Ova and Parasites (10.24.17 @ 01:18)    Culture Results:   Testing in progress    Culture - Urine (10.24.17 @ 00:37)    Specimen Source: .Urine Clean Catch (Midstream)    Culture Results:   <10,000 CFU/ml  Normal Urogenital mikey present    Culture - Blood (10.23.17 @ 18:52)    Specimen Source: .Blood Blood-Venous    Culture Results:   No growth to date.    Culture - Blood (10.23.17 @ 18:52)    Specimen Source: .Blood Blood-Venous    Culture Results:   No growth to date.

## 2017-10-25 NOTE — DISCHARGE NOTE ADULT - PATIENT PORTAL LINK FT
“You can access the [facility] Patient Portal, offered by [facility], by registering with the following website: [email or web address]”

## 2017-10-25 NOTE — PROGRESS NOTE ADULT - PROBLEM SELECTOR PLAN 1
Track stool output and consistency.  Avoid empiric antibiotics when able, now and in the future.  No antimotility agents.  Avoid PPI and where antisecretory therapy is needed substitute alternate agents if possible.  Serial abdominal examinations.  Track WBC, serum creatinine, and electrolytes.  IV fluids as needed in the face of ongoing GI losses.  Replete electrolytes as needed.  Strict contact precautions.   Precautions may be discontinued when the patient has clinically improved and the patient has had no diarrhea for 48 hours.  Continue probitoics, though benefit is uncertain.

## 2017-10-25 NOTE — PROGRESS NOTE ADULT - PROBLEM SELECTOR PLAN 3
-Recently treated with po Ceftin x 5 days  -Denies urinary symptoms  -As per ID, no indication for treatment at this time  -Urine cx: negative

## 2017-10-25 NOTE — DIETITIAN INITIAL EVALUATION ADULT. - PROBLEM SELECTOR PLAN 3
More awake since arriving to ER after IVH  likely due to metabolic encephalopathy due to colitis, UTI, dehydration, less likely intracranial pathology in setting of fall as no overt neuro deficiets  -get ct head r/o occult bleed given fall  -treat UTI, colitis, dehydration  -fall risk

## 2017-10-25 NOTE — DISCHARGE NOTE ADULT - CARE PROVIDER_API CALL
Evette Small (KARI), Internal Medicine  [address] [place] [id number]  Phone: [phone]  Fax: [phone]

## 2017-10-25 NOTE — DIETITIAN INITIAL EVALUATION ADULT. - PROBLEM SELECTOR PLAN 4
-technically UTI should be resolved as been on PO ceftin x 5 days with Ecoli only resistant to quinolones  -UA with moderate LE, wbc, bacteria  -IV zosyn will cover UTI   -f/u new urine cx

## 2017-10-25 NOTE — PROGRESS NOTE ADULT - PROBLEM SELECTOR PLAN 7
-Hold antihypertensive medications for now.   -Continue to monitor vitals and kidney function  -Will restart home meds as needed

## 2017-10-26 LAB
ANION GAP SERPL CALC-SCNC: 8 MMOL/L — SIGNIFICANT CHANGE UP (ref 5–17)
BUN SERPL-MCNC: 22 MG/DL — SIGNIFICANT CHANGE UP (ref 7–23)
CALCIUM SERPL-MCNC: 8.6 MG/DL — SIGNIFICANT CHANGE UP (ref 8.5–10.1)
CHLORIDE SERPL-SCNC: 116 MMOL/L — HIGH (ref 96–108)
CO2 SERPL-SCNC: 21 MMOL/L — LOW (ref 22–31)
CREAT SERPL-MCNC: 0.96 MG/DL — SIGNIFICANT CHANGE UP (ref 0.5–1.3)
CULTURE RESULTS: SIGNIFICANT CHANGE UP
GLUCOSE SERPL-MCNC: 81 MG/DL — SIGNIFICANT CHANGE UP (ref 70–99)
HCT VFR BLD CALC: 34.4 % — LOW (ref 34.5–45)
HGB BLD-MCNC: 10.8 G/DL — LOW (ref 11.5–15.5)
MAGNESIUM SERPL-MCNC: 1.9 MG/DL — SIGNIFICANT CHANGE UP (ref 1.6–2.6)
MCHC RBC-ENTMCNC: 29.9 PG — SIGNIFICANT CHANGE UP (ref 27–34)
MCHC RBC-ENTMCNC: 31.3 GM/DL — LOW (ref 32–36)
MCV RBC AUTO: 95.6 FL — SIGNIFICANT CHANGE UP (ref 80–100)
PHOSPHATE SERPL-MCNC: 2.2 MG/DL — LOW (ref 2.5–4.5)
PLATELET # BLD AUTO: 286 K/UL — SIGNIFICANT CHANGE UP (ref 150–400)
POTASSIUM SERPL-MCNC: 4 MMOL/L — SIGNIFICANT CHANGE UP (ref 3.5–5.3)
POTASSIUM SERPL-SCNC: 4 MMOL/L — SIGNIFICANT CHANGE UP (ref 3.5–5.3)
RBC # BLD: 3.61 M/UL — LOW (ref 3.8–5.2)
RBC # FLD: 13.4 % — SIGNIFICANT CHANGE UP (ref 10.3–14.5)
SODIUM SERPL-SCNC: 145 MMOL/L — SIGNIFICANT CHANGE UP (ref 135–145)
SPECIMEN SOURCE: SIGNIFICANT CHANGE UP
WBC # BLD: 13.6 K/UL — HIGH (ref 3.8–10.5)
WBC # FLD AUTO: 13.6 K/UL — HIGH (ref 3.8–10.5)

## 2017-10-26 PROCEDURE — 99233 SBSQ HOSP IP/OBS HIGH 50: CPT

## 2017-10-26 RX ORDER — POTASSIUM PHOSPHATE, MONOBASIC POTASSIUM PHOSPHATE, DIBASIC 236; 224 MG/ML; MG/ML
15 INJECTION, SOLUTION INTRAVENOUS ONCE
Qty: 0 | Refills: 0 | Status: COMPLETED | OUTPATIENT
Start: 2017-10-26 | End: 2017-10-26

## 2017-10-26 RX ADMIN — Medication 81 MILLIGRAM(S): at 11:49

## 2017-10-26 RX ADMIN — POTASSIUM PHOSPHATE, MONOBASIC POTASSIUM PHOSPHATE, DIBASIC 62.5 MILLIMOLE(S): 236; 224 INJECTION, SOLUTION INTRAVENOUS at 10:09

## 2017-10-26 RX ADMIN — ESCITALOPRAM OXALATE 20 MILLIGRAM(S): 10 TABLET, FILM COATED ORAL at 11:49

## 2017-10-26 RX ADMIN — Medication 1 TABLET(S): at 11:49

## 2017-10-26 RX ADMIN — HEPARIN SODIUM 5000 UNIT(S): 5000 INJECTION INTRAVENOUS; SUBCUTANEOUS at 05:35

## 2017-10-26 RX ADMIN — SIMVASTATIN 20 MILLIGRAM(S): 20 TABLET, FILM COATED ORAL at 22:57

## 2017-10-26 RX ADMIN — Medication 125 MILLIGRAM(S): at 05:35

## 2017-10-26 RX ADMIN — Medication 125 MILLIGRAM(S): at 11:48

## 2017-10-26 RX ADMIN — Medication 125 MILLIGRAM(S): at 02:03

## 2017-10-26 RX ADMIN — Medication 125 MILLIGRAM(S): at 17:51

## 2017-10-26 RX ADMIN — HEPARIN SODIUM 5000 UNIT(S): 5000 INJECTION INTRAVENOUS; SUBCUTANEOUS at 17:52

## 2017-10-26 NOTE — PROGRESS NOTE ADULT - SUBJECTIVE AND OBJECTIVE BOX
Resident Progress Note    Patient is a 87y old  Female who presents with a chief complaint of diarrhea x 1 wk (25 Oct 2017 10:05)      HPI: Patient seen and examined at bedside. No acute events overnight. Patient was lethargic and uncooperative with exam. States she is not in pain or discomfort. She states feels tired. As per RN, there has been one BM since previous day. As per nursing, the patient tolerated some yogurt for breakfast.     ROS: Limited ROS due because the patient refused to cooperate.  Respiratory: denies SOB  Cardiovascular: denies CP, palpitations  Gastrointestinal: denies nausea, vomiting, diarrhea, constipation, abdominal pain  Neurologic: denies headache, weakness, dizziness   ROS negative except as noted above    Vital Signs Last 24 Hrs  T(C): 36.9 (10-26-17 @ 05:45), Max: 36.9 (10-25-17 @ 20:15)  T(F): 98.4 (10-26-17 @ 05:45), Max: 98.4 (10-25-17 @ 20:15)  HR: 71 (10-26-17 @ 05:45) (63 - 73)  BP: 142/82 (10-26-17 @ 05:45) (142/82 - 170/82)  BP(mean): --  RR: 18 (10-26-17 @ 05:45) (17 - 18)  SpO2: 95% (10-26-17 @ 05:45) (93% - 97%)    Physical Exam:  General: Pale, sleeping, NAD  HEENT: moist mucous membranes   Neck: Supple, nontender, no mass  Neurology: A&Ox3, nonfocal, CN II-XII grossly intact, sensation intact, no gait abnormalities   Respiratory: CTA B/L, No W/R/R  CV: RRR, +S1/S2, no murmurs, rubs or gallops  Abdominal: Soft, NT, ND +BSx4  Extremities: No C/C/E, + peripheral pulses  MSK: Normal ROM, no joint erythema or warmth, no joint swelling   Skin: warm, dry, normal color, no rash or abnormal lesions    LABS:                        10.8   13.6  )-----------( 286      ( 26 Oct 2017 07:20 )             34.4     26 Oct 2017 07:20    145    |  116    |  22     ----------------------------<  81     4.0     |  21     |  0.96     Ca    8.6        26 Oct 2017 07:20  Phos  2.2       26 Oct 2017 07:20  Mg     1.9       26 Oct 2017 07:20              CAPILLARY BLOOD GLUCOSE      POCT Blood Glucose.: 102 mg/dL (26 Oct 2017 07:22)        RADIOLOGY & ADDITIONAL TESTS:    MEDICATIONS  (STANDING):  aspirin enteric coated 81 milliGRAM(s) Oral daily  escitalopram 20 milliGRAM(s) Oral daily  heparin  Injectable 5000 Unit(s) SubCutaneous every 12 hours  lactobacillus acidophilus 1 Tablet(s) Oral daily  simvastatin 20 milliGRAM(s) Oral at bedtime  sodium chloride 0.9%. 1000 milliLiter(s) (60 mL/Hr) IV Continuous <Continuous>  vancomycin    Solution 125 milliGRAM(s) Oral every 6 hours    MEDICATIONS  (PRN):  risperiDONE   Tablet 0.25 milliGRAM(s) Oral at bedtime PRN agitation      Allergies    sulfa drugs (Unknown) Resident Progress Note    Patient is a 87y old  Female who presents with a chief complaint of diarrhea x 1 wk (25 Oct 2017 10:05)      HPI: Patient seen and examined at bedside. No acute events overnight. Patient was lethargic and uncooperative with exam. States she is not in pain or discomfort. She states feels tired. As per RN, there has been one BM since previous day. As per nursing, the patient tolerated some yogurt for breakfast.     ROS: Limited ROS due because the patient refused to cooperate.  Respiratory: denies SOB  Cardiovascular: denies CP, palpitations  Gastrointestinal: denies nausea, vomiting, diarrhea, constipation, abdominal pain  Neurologic: denies headache, weakness, dizziness   ROS negative except as noted above    Vital Signs Last 24 Hrs  T(C): 36.9 (10-26-17 @ 05:45), Max: 36.9 (10-25-17 @ 20:15)  T(F): 98.4 (10-26-17 @ 05:45), Max: 98.4 (10-25-17 @ 20:15)  HR: 71 (10-26-17 @ 05:45) (63 - 73)  BP: 142/82 (10-26-17 @ 05:45) (142/82 - 170/82)  BP(mean): --  RR: 18 (10-26-17 @ 05:45) (17 - 18)  SpO2: 95% (10-26-17 @ 05:45) (93% - 97%)    Physical Exam:  General: Pale, sleeping, NAD  HEENT: moist mucous membranes   Neck: Supple, nontender, no mass  Neurology: A&Ox0  Respiratory: CTA B/L, No W/R/R  CV: RRR, +S1/S2, no murmurs, rubs or gallops  Abdominal: Soft, NT, ND +BSx4  Extremities: No C/C/E, +2 peripheral pulses  Skin: warm, dry, normal color    LABS:                        10.8   13.6  )-----------( 286      ( 26 Oct 2017 07:20 )             34.4     26 Oct 2017 07:20    145    |  116    |  22     ----------------------------<  81     4.0     |  21     |  0.96     Ca    8.6        26 Oct 2017 07:20  Phos  2.2       26 Oct 2017 07:20  Mg     1.9       26 Oct 2017 07:20              CAPILLARY BLOOD GLUCOSE      POCT Blood Glucose.: 102 mg/dL (26 Oct 2017 07:22)        RADIOLOGY & ADDITIONAL TESTS:    MEDICATIONS  (STANDING):  aspirin enteric coated 81 milliGRAM(s) Oral daily  escitalopram 20 milliGRAM(s) Oral daily  heparin  Injectable 5000 Unit(s) SubCutaneous every 12 hours  lactobacillus acidophilus 1 Tablet(s) Oral daily  simvastatin 20 milliGRAM(s) Oral at bedtime  sodium chloride 0.9%. 1000 milliLiter(s) (60 mL/Hr) IV Continuous <Continuous>  vancomycin    Solution 125 milliGRAM(s) Oral every 6 hours    MEDICATIONS  (PRN):  risperiDONE   Tablet 0.25 milliGRAM(s) Oral at bedtime PRN agitation      Allergies    sulfa drugs (Unknown)

## 2017-10-26 NOTE — PROGRESS NOTE ADULT - PROBLEM SELECTOR PLAN 5
-Resolved  -K 4, Mg 1.9, Phos 2.2  -Replete with  potassium phosphate 15 mMol for hypophosphatemia  -Follow up BMP

## 2017-10-26 NOTE — PROGRESS NOTE ADULT - PROBLEM SELECTOR PLAN 2
-Head CT negative for No acute intracranial hemorrhage or acute territorial infarct  -Possibly 2/2 to C. diff colitis. causing encephalopathy   -Fall precautions  -Continue to monitor

## 2017-10-26 NOTE — PROGRESS NOTE ADULT - PROBLEM SELECTOR PLAN 1
-Improving  -1 BM noted overnight  -CT showing pancolitis   -C. Diff toxin positive  -Contact precautions  -ID consult, Dr. Lackey, recommendations appreciated  -Continue po Vanco.   -Follow up stool cx negative (pending final read)  -Blood cx showed no growth to date

## 2017-10-27 VITALS
TEMPERATURE: 98 F | SYSTOLIC BLOOD PRESSURE: 150 MMHG | RESPIRATION RATE: 17 BRPM | HEART RATE: 52 BPM | DIASTOLIC BLOOD PRESSURE: 68 MMHG | OXYGEN SATURATION: 95 %

## 2017-10-27 LAB
ANION GAP SERPL CALC-SCNC: 8 MMOL/L — SIGNIFICANT CHANGE UP (ref 5–17)
BUN SERPL-MCNC: 22 MG/DL — SIGNIFICANT CHANGE UP (ref 7–23)
CALCIUM SERPL-MCNC: 9 MG/DL — SIGNIFICANT CHANGE UP (ref 8.5–10.1)
CHLORIDE SERPL-SCNC: 115 MMOL/L — HIGH (ref 96–108)
CO2 SERPL-SCNC: 24 MMOL/L — SIGNIFICANT CHANGE UP (ref 22–31)
CREAT SERPL-MCNC: 0.84 MG/DL — SIGNIFICANT CHANGE UP (ref 0.5–1.3)
GLUCOSE SERPL-MCNC: 86 MG/DL — SIGNIFICANT CHANGE UP (ref 70–99)
HCT VFR BLD CALC: 35 % — SIGNIFICANT CHANGE UP (ref 34.5–45)
HGB BLD-MCNC: 11 G/DL — LOW (ref 11.5–15.5)
MAGNESIUM SERPL-MCNC: 1.8 MG/DL — SIGNIFICANT CHANGE UP (ref 1.6–2.6)
MCHC RBC-ENTMCNC: 29.8 PG — SIGNIFICANT CHANGE UP (ref 27–34)
MCHC RBC-ENTMCNC: 31.3 GM/DL — LOW (ref 32–36)
MCV RBC AUTO: 95.1 FL — SIGNIFICANT CHANGE UP (ref 80–100)
PHOSPHATE SERPL-MCNC: 2.7 MG/DL — SIGNIFICANT CHANGE UP (ref 2.5–4.5)
PLATELET # BLD AUTO: 309 K/UL — SIGNIFICANT CHANGE UP (ref 150–400)
POTASSIUM SERPL-MCNC: 3.9 MMOL/L — SIGNIFICANT CHANGE UP (ref 3.5–5.3)
POTASSIUM SERPL-SCNC: 3.9 MMOL/L — SIGNIFICANT CHANGE UP (ref 3.5–5.3)
RBC # BLD: 3.68 M/UL — LOW (ref 3.8–5.2)
RBC # FLD: 13.9 % — SIGNIFICANT CHANGE UP (ref 10.3–14.5)
SODIUM SERPL-SCNC: 147 MMOL/L — HIGH (ref 135–145)
WBC # BLD: 13.3 K/UL — HIGH (ref 3.8–10.5)
WBC # FLD AUTO: 13.3 K/UL — HIGH (ref 3.8–10.5)

## 2017-10-27 PROCEDURE — 99239 HOSP IP/OBS DSCHRG MGMT >30: CPT

## 2017-10-27 RX ORDER — ESCITALOPRAM OXALATE 10 MG/1
1 TABLET, FILM COATED ORAL
Qty: 0 | Refills: 0 | COMMUNITY

## 2017-10-27 RX ORDER — HYDRALAZINE HCL 50 MG
1 TABLET ORAL
Qty: 0 | Refills: 0 | COMMUNITY
Start: 2017-10-27

## 2017-10-27 RX ORDER — VERAPAMIL HCL 240 MG
120 CAPSULE, EXTENDED RELEASE PELLETS 24 HR ORAL AT BEDTIME
Qty: 0 | Refills: 0 | Status: DISCONTINUED | OUTPATIENT
Start: 2017-10-27 | End: 2017-10-27

## 2017-10-27 RX ORDER — HYDRALAZINE HCL 50 MG
10 TABLET ORAL
Qty: 0 | Refills: 0 | COMMUNITY

## 2017-10-27 RX ORDER — VANCOMYCIN HCL 1 G
5 VIAL (EA) INTRAVENOUS
Qty: 200 | Refills: 0 | OUTPATIENT
Start: 2017-10-27 | End: 2017-11-06

## 2017-10-27 RX ORDER — HYDROCHLOROTHIAZIDE 25 MG
25 TABLET ORAL DAILY
Qty: 0 | Refills: 0 | Status: DISCONTINUED | OUTPATIENT
Start: 2017-10-27 | End: 2017-10-27

## 2017-10-27 RX ORDER — HYDRALAZINE HCL 50 MG
10 TABLET ORAL THREE TIMES A DAY
Qty: 0 | Refills: 0 | Status: DISCONTINUED | OUTPATIENT
Start: 2017-10-27 | End: 2017-10-27

## 2017-10-27 RX ORDER — RISPERIDONE 4 MG/1
1 TABLET ORAL
Qty: 0 | Refills: 0 | COMMUNITY

## 2017-10-27 RX ORDER — VALSARTAN 80 MG/1
160 TABLET ORAL DAILY
Qty: 0 | Refills: 0 | Status: DISCONTINUED | OUTPATIENT
Start: 2017-10-27 | End: 2017-10-27

## 2017-10-27 RX ORDER — CHOLECALCIFEROL (VITAMIN D3) 125 MCG
1 CAPSULE ORAL
Qty: 0 | Refills: 0 | COMMUNITY

## 2017-10-27 RX ADMIN — Medication 10 MILLIGRAM(S): at 13:40

## 2017-10-27 RX ADMIN — Medication 1 TABLET(S): at 11:55

## 2017-10-27 RX ADMIN — Medication 125 MILLIGRAM(S): at 06:22

## 2017-10-27 RX ADMIN — VALSARTAN 160 MILLIGRAM(S): 80 TABLET ORAL at 13:40

## 2017-10-27 RX ADMIN — Medication 125 MILLIGRAM(S): at 11:57

## 2017-10-27 RX ADMIN — ESCITALOPRAM OXALATE 20 MILLIGRAM(S): 10 TABLET, FILM COATED ORAL at 11:55

## 2017-10-27 RX ADMIN — HEPARIN SODIUM 5000 UNIT(S): 5000 INJECTION INTRAVENOUS; SUBCUTANEOUS at 06:21

## 2017-10-27 RX ADMIN — Medication 125 MILLIGRAM(S): at 00:35

## 2017-10-27 RX ADMIN — Medication 81 MILLIGRAM(S): at 11:56

## 2017-10-27 NOTE — PROGRESS NOTE ADULT - SUBJECTIVE AND OBJECTIVE BOX
Resident Progress Note    Patient is a 87y old  Female who presents with a chief complaint of diarrhea x 1 wk (25 Oct 2017 10:05)      HPI: Patient seen and examined at bedside. No acute events overnight. The patient denies abdominal pain. Unable to state whether BM have decreased. As per nursing, the patient has not had a BM since yesterday. She is tolerating her meals and has no complaints at this time.     ROS: Limited ROS due because the patient was unable to cooperate  Respiratory: denies SOB  Cardiovascular: denies CP, palpitations  Gastrointestinal: denies nausea, vomiting, diarrhea, constipation, abdominal pain  Neurologic: denies headache, weakness, dizziness   ROS negative except as noted above    Vital Signs Last 24 Hrs  T(C): 36.4 (10-27-17 @ 06:04), Max: 36.9 (10-26-17 @ 14:02)  T(F): 97.6 (10-27-17 @ 06:04), Max: 98.4 (10-26-17 @ 14:02)  HR: 96 (10-27-17 @ 06:04) (52 - 96)  BP: 162/80 (10-27-17 @ 07:26) (130/66 - 189/82)  BP(mean): --  RR: 18 (10-27-17 @ 06:04) (17 - 18)  SpO2: 96% (10-27-17 @ 06:04) (93% - 96%)    Physical Exam:  General: Pale, NAD  HEENT: moist mucous membranes   Neck: Supple, nontender, no mass  Neurology: A&Ox0  Respiratory: CTA B/L, No W/R/R  CV: RRR, +S1/S2, no murmurs, rubs or gallops  Abdominal: Soft, NT, ND +BSx4  Extremities: No clubbing, cyanosis, edema, +2 peripheral pulses  Skin: warm, dry, normal color    LABS:                        11.0   13.3  )-----------( 309      ( 27 Oct 2017 06:41 )             35.0     27 Oct 2017 06:41    147    |  115    |  22     ----------------------------<  86     3.9     |  24     |  0.84     Ca    9.0        27 Oct 2017 06:41  Phos  2.7       27 Oct 2017 06:41  Mg     1.8       27 Oct 2017 06:41          RADIOLOGY & ADDITIONAL TESTS:    MEDICATIONS  (STANDING):  aspirin enteric coated 81 milliGRAM(s) Oral daily  escitalopram 20 milliGRAM(s) Oral daily  heparin  Injectable 5000 Unit(s) SubCutaneous every 12 hours  hydrALAZINE 10 milliGRAM(s) Oral three times a day  hydrochlorothiazide 25 milliGRAM(s) Oral daily  lactobacillus acidophilus 1 Tablet(s) Oral daily  simvastatin 20 milliGRAM(s) Oral at bedtime  sodium chloride 0.9%. 1000 milliLiter(s) (60 mL/Hr) IV Continuous <Continuous>  valsartan 160 milliGRAM(s) Oral daily  vancomycin    Solution 125 milliGRAM(s) Oral every 6 hours  verapamil 120 milliGRAM(s) Oral at bedtime    MEDICATIONS  (PRN):  risperiDONE   Tablet 0.25 milliGRAM(s) Oral at bedtime PRN agitation      Allergies    sulfa drugs (Unknown)    Intolerances

## 2017-10-27 NOTE — PROGRESS NOTE ADULT - PROBLEM SELECTOR PROBLEM 7
Hypertension, unspecified type

## 2017-10-27 NOTE — PROGRESS NOTE ADULT - PROBLEM SELECTOR PLAN 6
-Head CT negative for No acute intracranial hemorrhage or acute territorial infarct  -Fall risk protocol  -PT recommendations appreciated. Discharge planning for CHARIS

## 2017-10-27 NOTE — PROGRESS NOTE ADULT - PROBLEM SELECTOR PROBLEM 1
Clostridium difficile diarrhea
Clostridium difficile diarrhea
Pancolitis

## 2017-10-27 NOTE — PROGRESS NOTE ADULT - ASSESSMENT
86 y/o F with PMH of dementia, HTN, HLD, UTI's, cdif in 2016, bladder prolapse being treated for UTI p/w several episodes of diarrhea with fall with AMS, severe leukocytosis with bandemia, elevated creatinine, hypoalbuminemia, pancolitis on CT admitted for C. diff pancolitis, NATALI, metabolic encephalopathy and fall workup.
C. difficile colitis  Dementia  Overall doing well but still with mild leukocytosis  No concern of uncontrolled infection on exam
C. difficile colitis  WBC decreased  Cx otherwise NTD  Benign abdomen  Possible NATALI  Dementia
88 y/o F with PMH of dementia, HTN, HLD, UTI's, cdif in 2016, bladder prolapse being treated for UTI p/w several episodes of diarrhea with fall with AMS, severe leukocytosis with bandemia, elevated creatinine, hypoalbuminemia, pancolitis on CT admitted for C. diff pancolitis, NATALI, metabolic encephalopathy and fall workup.

## 2017-10-27 NOTE — PROGRESS NOTE ADULT - PROBLEM SELECTOR PLAN 1
-Improving  -0 BM noted overnight  -CT showing pancolitis   -C. Diff toxin positive  -Contact precautions  -ID consult, Dr. Lackey, recommendations appreciated  -Continue po Vanco.   -Stool cx: no enteric pathogens noted  -Blood cx showed no growth to date

## 2017-10-27 NOTE — PROGRESS NOTE ADULT - SUBJECTIVE AND OBJECTIVE BOX
[facility], Division of Infectious Diseases  EDDIE Almaraz A. Lee    Name: MORITZ, JANE  Age: 87y  Gender: Female  MRN: [id number]    Interval History--  Notes reviewed. Pleasantly confused. Discussed with care team, for discharge.     Past Medical History--  Dementia  High cholesterol  UTI (urinary tract infection)  HTN (hypertension)  No significant past surgical history      For details regarding the patient's social history, family history, and other miscellaneous elements, please refer the initial infectious diseases consultation and/or the admitting history and physical examination for this admission.    Allergies    sulfa drugs (Unknown)    Intolerances    Medications--  Antibiotics:  vancomycin    Solution 125 milliGRAM(s) Oral every 6 hours    Immunologic:    Other:  aspirin enteric coated  escitalopram  heparin  Injectable  hydrALAZINE  hydrochlorothiazide  lactobacillus acidophilus  risperiDONE   Tablet PRN  simvastatin  valsartan  verapamil SR      Review of Systems--  Review of systems unable due to dementia.      Physical Examination--  Vital Signs: T(F): 97.6 (10-27-17 @ 06:04), Max: 98.4 (10-26-17 @ 14:02)  HR: 96 (10-27-17 @ 06:04)  BP: 162/80 (10-27-17 @ 07:26)  RR: 18 (10-27-17 @ 06:04)  SpO2: 96% (10-27-17 @ 06:04)  Wt(kg): --  General: Nontoxic-appearing Female in no acute distress.  HEENT: Anicteric. Conjunctiva pink and moist. Oropharynx clear.  Neck: Not rigid. No sense of mass.  Nodes: None palpable.  Lungs: Clear bilaterally without rales, wheezing or rhonchi  Heart: Regular rate and rhythm. I-II/VI Murmur. No rub. No gallop. No palpable thrill.  Abdomen: Bowel sounds present and normoactive. Soft. Mildly distended. No tenderness, nor guarding or rebound. No sense of mass. No organomegaly.  Back: No spinal tenderness. No costovertebral angle tenderness.   Extremities: No cyanosis or clubbing. No edema.   Skin: Warm. Dry. Good turgor. No rash. No vasculitic stigmata.  Psychiatric: Appropriate affect and mood for situation, though again disoriented.     Laboratory Studies--  CBC                        11.0   13.3  )-----------( 309      ( 27 Oct 2017 06:41 )             35.0       Chemistries  10-27    147<H>  |  115<H>  |  22  ----------------------------<  86  3.9   |  24  |  0.84    Ca    9.0      27 Oct 2017 06:41  Phos  2.7     10-27  Mg     1.8     10-27      Culture Data  No new data

## 2017-10-27 NOTE — PROGRESS NOTE ADULT - PROBLEM SELECTOR PROBLEM 8
Alzheimer's dementia without behavioral disturbance, unspecified timing of dementia onset

## 2017-10-27 NOTE — PROGRESS NOTE ADULT - ATTENDING COMMENTS
Thank you for the courtesy of this referral.  I'll sign off at this time.     Andrea Lackey MD  [phone]
monitor electrolytes.
DC planning for am. Needs to set up 24 hour HHA.
Monitor electrolytes.

## 2017-10-28 LAB
CULTURE RESULTS: SIGNIFICANT CHANGE UP
CULTURE RESULTS: SIGNIFICANT CHANGE UP
SPECIMEN SOURCE: SIGNIFICANT CHANGE UP
SPECIMEN SOURCE: SIGNIFICANT CHANGE UP

## 2017-10-30 DIAGNOSIS — Z88.2 ALLERGY STATUS TO SULFONAMIDES: ICD-10-CM

## 2017-10-30 DIAGNOSIS — E88.09 OTHER DISORDERS OF PLASMA-PROTEIN METABOLISM, NOT ELSEWHERE CLASSIFIED: ICD-10-CM

## 2017-10-30 DIAGNOSIS — E78.5 HYPERLIPIDEMIA, UNSPECIFIED: ICD-10-CM

## 2017-10-30 DIAGNOSIS — D72.829 ELEVATED WHITE BLOOD CELL COUNT, UNSPECIFIED: ICD-10-CM

## 2017-10-30 DIAGNOSIS — E83.42 HYPOMAGNESEMIA: ICD-10-CM

## 2017-10-30 DIAGNOSIS — N17.9 ACUTE KIDNEY FAILURE, UNSPECIFIED: ICD-10-CM

## 2017-10-30 DIAGNOSIS — R13.10 DYSPHAGIA, UNSPECIFIED: ICD-10-CM

## 2017-10-30 DIAGNOSIS — E86.0 DEHYDRATION: ICD-10-CM

## 2017-10-30 DIAGNOSIS — G93.41 METABOLIC ENCEPHALOPATHY: ICD-10-CM

## 2017-10-30 DIAGNOSIS — G30.9 ALZHEIMER'S DISEASE, UNSPECIFIED: ICD-10-CM

## 2017-10-30 DIAGNOSIS — F02.80 DEMENTIA IN OTHER DISEASES CLASSIFIED ELSEWHERE, UNSPECIFIED SEVERITY, WITHOUT BEHAVIORAL DISTURBANCE, PSYCHOTIC DISTURBANCE, MOOD DISTURBANCE, AND ANXIETY: ICD-10-CM

## 2017-10-30 DIAGNOSIS — K51.00 ULCERATIVE (CHRONIC) PANCOLITIS WITHOUT COMPLICATIONS: ICD-10-CM

## 2017-10-30 DIAGNOSIS — E87.6 HYPOKALEMIA: ICD-10-CM

## 2017-10-30 DIAGNOSIS — I10 ESSENTIAL (PRIMARY) HYPERTENSION: ICD-10-CM

## 2017-10-30 DIAGNOSIS — E83.39 OTHER DISORDERS OF PHOSPHORUS METABOLISM: ICD-10-CM

## 2017-10-30 DIAGNOSIS — A04.72 ENTEROCOLITIS DUE TO CLOSTRIDIUM DIFFICILE, NOT SPECIFIED AS RECURRENT: ICD-10-CM

## 2017-12-19 PROCEDURE — 87045 FECES CULTURE AEROBIC BACT: CPT

## 2017-12-19 PROCEDURE — 82553 CREATINE MB FRACTION: CPT

## 2017-12-19 PROCEDURE — 72170 X-RAY EXAM OF PELVIS: CPT

## 2017-12-19 PROCEDURE — 87046 STOOL CULTR AEROBIC BACT EA: CPT

## 2017-12-19 PROCEDURE — 82150 ASSAY OF AMYLASE: CPT

## 2017-12-19 PROCEDURE — 70450 CT HEAD/BRAIN W/O DYE: CPT

## 2017-12-19 PROCEDURE — 96372 THER/PROPH/DIAG INJ SC/IM: CPT | Mod: 59

## 2017-12-19 PROCEDURE — 83690 ASSAY OF LIPASE: CPT

## 2017-12-19 PROCEDURE — 80048 BASIC METABOLIC PNL TOTAL CA: CPT

## 2017-12-19 PROCEDURE — 82550 ASSAY OF CK (CPK): CPT

## 2017-12-19 PROCEDURE — 87177 OVA AND PARASITES SMEARS: CPT

## 2017-12-19 PROCEDURE — 74176 CT ABD & PELVIS W/O CONTRAST: CPT

## 2017-12-19 PROCEDURE — 87086 URINE CULTURE/COLONY COUNT: CPT

## 2017-12-19 PROCEDURE — 84100 ASSAY OF PHOSPHORUS: CPT

## 2017-12-19 PROCEDURE — 83605 ASSAY OF LACTIC ACID: CPT

## 2017-12-19 PROCEDURE — 71045 X-RAY EXAM CHEST 1 VIEW: CPT

## 2017-12-19 PROCEDURE — 80053 COMPREHEN METABOLIC PANEL: CPT

## 2017-12-19 PROCEDURE — 87493 C DIFF AMPLIFIED PROBE: CPT

## 2017-12-19 PROCEDURE — 99285 EMERGENCY DEPT VISIT HI MDM: CPT | Mod: 25

## 2017-12-19 PROCEDURE — 97530 THERAPEUTIC ACTIVITIES: CPT

## 2017-12-19 PROCEDURE — 96376 TX/PRO/DX INJ SAME DRUG ADON: CPT

## 2017-12-19 PROCEDURE — 96367 TX/PROPH/DG ADDL SEQ IV INF: CPT

## 2017-12-19 PROCEDURE — 82962 GLUCOSE BLOOD TEST: CPT

## 2017-12-19 PROCEDURE — 87040 BLOOD CULTURE FOR BACTERIA: CPT

## 2017-12-19 PROCEDURE — 97116 GAIT TRAINING THERAPY: CPT

## 2017-12-19 PROCEDURE — 96375 TX/PRO/DX INJ NEW DRUG ADDON: CPT

## 2017-12-19 PROCEDURE — 97161 PT EVAL LOW COMPLEX 20 MIN: CPT

## 2017-12-19 PROCEDURE — 84484 ASSAY OF TROPONIN QUANT: CPT

## 2017-12-19 PROCEDURE — 81001 URINALYSIS AUTO W/SCOPE: CPT

## 2017-12-19 PROCEDURE — 96365 THER/PROPH/DIAG IV INF INIT: CPT

## 2017-12-19 PROCEDURE — 93005 ELECTROCARDIOGRAM TRACING: CPT

## 2017-12-19 PROCEDURE — 83735 ASSAY OF MAGNESIUM: CPT

## 2017-12-19 PROCEDURE — 85027 COMPLETE CBC AUTOMATED: CPT

## 2018-07-30 PROBLEM — N95.2 POSTMENOPAUSAL ATROPHIC VAGINITIS: Status: ACTIVE | Noted: 2017-07-24

## 2020-01-20 NOTE — PROGRESS NOTE ADULT - PROBLEM SELECTOR PROBLEM 3
Pt wife is calling stating that pt is still in pain and nothing has helped so far and wants to know Dr La Tejada recommendations       Pt wife can be reached at [phone] Urinary tract infection without hematuria, site unspecified

## 2021-04-14 NOTE — CONSULT NOTE ADULT - SUBJECTIVE AND OBJECTIVE BOX
[facility], Division of Infectious Diseases  EDDIE Almaraz A. Lee    MORITZ, MABEL  87y, Female  [id number]    HPI--  87F, awake/alert and interactive but essentially a non-historian. Does not know where she is, her age, or any recent events. Denies any medical problems, histories, allergies, etc. Patient history largely from the chart. Patient admitted with diarrhea after being given several courses of antibiotics for concern of UTI. Extent of prior workup unknown. Here patient with elevated WBC, extensive diarrhea per dissussion with nursing, and now known to be C. difficle +. Patient without complaints presently, for what it is worth.    PMH/PSH--  Dementia  High cholesterol  UTI (urinary tract infection)  HTN (hypertension)      Allergies-- Sulfa -> ?      Medications--  Antibiotics: ciprofloxacin   IVPB 400 milliGRAM(s) IV Intermittent every 24 hours  metroNIDAZOLE    Tablet 500 milliGRAM(s) Oral every 8 hours  vancomycin    Solution 125 milliGRAM(s) Oral every 6 hours    Immunologic:   Other: aspirin enteric coated  escitalopram  heparin  Injectable  lactobacillus acidophilus  risperiDONE   Tablet PRN  simvastatin  sodium chloride 0.9%.      Social History--  EtOH: denies   Tobacco: denies   Drug Use: denies     Family/Marital History--  Unable    Travel/Environmental/Occupational History:  Unable    Review of Systems:  Unable    Physical Exam--  Vital Signs: T(F): 98.6 (10-24-17 @ 10:12), Max: 99.8 (10-23-17 @ 15:17)  HR: 74 (10-24-17 @ 10:12)  BP: 136/66 (10-24-17 @ 10:12)  RR: 14 (10-24-17 @ 10:12)  SpO2: 98% (10-24-17 @ 10:12)  Wt(kg): --  General: Nontoxic-appearing Female in no acute distress.  HEENT: AT/NC. Limited, surgical pupils?. EOMI. Anicteric. Conjunctiva pink and moist. Oropharynx clear. Dentition fair.  Neck: Not rigid. No sense of mass.  Nodes: None palpable.  Lungs: Clear bilaterally without rales, wheezing or rhonchi  Heart: Regular rate and rhythm. I-II/VI Murmur. No rub. No gallop. No palpable thrill.  Abdomen: Bowel sounds present and normoactive. Soft. Mildly distended. Mild diffuse tenderness without guarding or rebound. No sense of mass. No organomegaly.  Back: No spinal tenderness. No costovertebral angle tenderness.   Extremities: No cyanosis or clubbing. No edema.   Skin: Warm. Dry. Good turgor. No rash. No vasculitic stigmata.  Psychiatric: Appropriate affect and mood for situation.         Laboratory & Imaging Data--  CBC                        9.8    17.9  )-----------( 238      ( 24 Oct 2017 06:28 )             31.0     WBC Count: 25.4 K/uL (10.23.17 @ 14:29)  WBC Count: 10.6 K/uL (10.18.17 @ 16:41)      Chemistries  10-24    140  |  107  |  43<H>  ----------------------------<  86  2.8<LL>   |  24  |  1.60<H>    Creatinine, Serum: 1.90 mg/dL (10.18.17 @ 16:41)    Creatinine, Serum: 0.90 mg/dL (11.17.16 @ 07:23)    Ca    8.2<L>      24 Oct 2017 06:28  Phos  2.4     10-24  Mg     1.2     10-24    TPro  6.6  /  Alb  2.6<L>  /  TBili  0.6  /  DBili  x   /  AST  31  /  ALT  12  /  AlkPhos  74  10-23    Clostridium difficile Toxin by PCR (10.23.17 @ 18:39)    Clostridium difficile Toxin by PCR: The results of this test should be interpreted with consideration of all  clinical and laboratory findings. This test determines the presence of  the C. difficile tcdB gene at a given time and is not intended to  identify antibiotic associated disease or C. difficile infection without  clinical context. Successful treatment is based on the resolution of  clinical symptoms. This test should not be used as a "test of cure"  because C. difficile DNA will persist after successful treatment. Repeat  testing will not be permitted.    This test is performed on the BD MAX system using Real-Time PCR and  fluorogenic target-specific hybridization.    C Diff by PCR Result: Detected    Urinalysis (10.23.17 @ 19:48)    pH Urine: 7.0    Glucose Qualitative, Urine: Negative    Blood, Urine: Large    Color: Yellow    Urine Appearance: Slightly Turbid    Bilirubin: Negative    Ketone - Urine: Negative    Specific Gravity: 1.005    Protein, Urine: 75 mg/dL    Urobilinogen: Negative    Nitrite: Negative    Leukocyte Esterase Concentration: Moderate  Urine Microscopic-Add On (NC) (10.23.17 @ 19:48)    Bacteria: Few    Epithelial Cells: Few    Red Blood Cell - Urine: 3-5 /HPF    White Blood Cell - Urine: 6-10    Urinalysis (10.18.17 @ 19:03)    pH Urine: 7.0    Blood, Urine: Trace    Glucose Qualitative, Urine: Negative    Color: Yellow    Urine Appearance: Clear    Bilirubin: Negative    Ketone - Urine: Negative    Specific Gravity: 1.010    Protein, Urine: 25 mg/dL    Urobilinogen: Negative    Nitrite: Negative    Leukocyte Esterase Concentration: Moderate  Urine Microscopic-Add On (NC) (10.18.17 @ 19:03)    Bacteria: Many    Epithelial Cells: Few    Red Blood Cell - Urine: 0-2 /HPF    White Blood Cell - Urine: 26-50      < from: CT Abdomen and Pelvis No Cont (10.23.17 @ 17:03) >  EXAM:  CT ABDOMEN AND PELVIS                        PROCEDURE DATE:  10/23/2017    INTERPRETATION:  CLINICAL STATEMENT: lower abd pain, diarrhea  TECHNIQUE: CT of the abdomen and pelvis was performed without IV or oral   contrast.   COMPARISON: 11/10/2016    FINDINGS:  The lower chest demonstrates coronary artery calcification.    The evaluation of the abdominal viscera and the bowel is limited without   contrast.    The liver and  gallbladder are grossly unremarkable.    The spleen, pancreas and adrenal glands are grossly unremarkable.    There is no hydronephrosis or urinary calculus. There is a large cyst   arising exophytically from the upper pole of the right kidney. There are   smaller left renal cysts. The bladder is unremarkable    There is no bowel obstruction.  There is no intraperitoneal free air.    There is no free fluid.The appendix is seen and appears unremarkable.   There is diffuse wall thickening and stranding along the colon from the   level of the rectum to the cecum suggesting pancolitis aerated there is   underlying diverticulosis.    There is no abdominal or pelvic lymphadenopathy.  The pelvic structures   are grossly unremarkable.    The aorta is not aneurysmal. There is no significant retroperitoneal or   pelvic lymphadenopathy. Pelvic structures are intact.    The bony structures demonstrate osteopenia and degenerative changes.    IMPRESSION: Pancolitis    Renal cysts with a large upper pole cyst on the right  SAMANTHA MICHELE  This document has been electronically signed. Oct 23 2017  5:11PM  < end of copied text >    Culture Data  Ova and Parasites (10.24.17 @ 01:18)    Culture Results:   Testing in progress    Culture - Urine (10.19.17 @ 08:41)    -  Amikacin: S <=8    -  Ampicillin: S <=2    -  Ampicillin/Sulbactam: S <=4/2    -  Tobramycin: S <=2    -  Trimethoprim/Sulfamethoxazole: S <=0.5/9.5    -  Aztreonam: S <=4    -  Cefazolin: S <=2    -  Cefepime: S <=2    -  Cefoxitin: S <=4    -  Ceftazidime: S <=1    -  Ceftriaxone: S <=1    -  Ciprofloxacin: R >2    -  Ertapenem: S <=0.5    -  Gentamicin: S <=1    -  Imipenem: S <=1    -  Levofloxacin: I 4    -  Meropenem: S <=1    -  Nitrofurantoin: S <=32    -  Piperacillin/Tazobactam: S <=8    Specimen Source: .Urine Catheterized    Culture Results:   >100,000 CFU/ml  Escherichia coli    Organism Identification: Escherichia coli    Organism: Escherichia coli    Method Type: RAGINI Attending Psychiatrist supervising NP/Trainee and meeting pt

## 2022-03-10 NOTE — PROGRESS NOTE ADULT - PROBLEM/PLAN-9
DISPLAY PLAN FREE TEXT
10-Mar-2022 13:14

## 2022-04-14 NOTE — ED ADULT NURSE NOTE - CAS TRG GENERAL NORM CIRC DET
Previous Accession (Optional): [id number] Previous Accession (Optional): [id number] Strong peripheral pulses

## 2022-07-06 NOTE — ED PROVIDER NOTE - ATTESTATION, MLM
Care Due:                  Date            Visit Type   Department     Provider  --------------------------------------------------------------------------------                                EP -                              PRIMARY      [facility]  Last Visit: 05-      CARE ([facility])   DYAN Newsome                              EP -                              PRIMARY      [facility]  Next Visit: 08-      CARE ([facility])   DYAN Newsome                                                            Last  Test          Frequency    Reason                     Performed    Due Date  --------------------------------------------------------------------------------    CBC.........  12 months..  valACYclovir.............  Not Found    Overdue    [employer] Embedded Care Gaps. Reference number: [id number]. 7/05/2022   10:26:17 PM CDT   I have reviewed and confirmed nurses' notes for patient's medications, allergies, medical history, and surgical history.

## 2022-10-27 NOTE — ED ADULT NURSE NOTE - NS ED NURSE REPORT GIVEN DT
Group Topic:  Behavioral Activation Group    Date: 10/27/2022  Start Time: 11:00 AM  End Time: 11:45 AM  Facilitators: Shayna London OT    Focus:  TIPP Skill  Number in attendance: 4  Patient participated in a group activity on the TIP skill. Patient identified what sort of things help them to cope when they are in high emotion. Patient also practiced breathing techniques, and a guided meditation to help with relaxation. Patient filled out a worksheet on what sort of situations they could use coping skills with. Patient identified she was tired due to moving and work.    Shayna London OT        Method: Group  Attendance: Present  Participation: Minimal  Patient Response: sleeping for some of group  Mood: Normal  Affect: Type: Euthymic (normal mood)   Range: Full (normal)   Congruency: Congruent   Stability: Stable  Behavior/Socialization: Appropriate to group  Thought Process: Tracking  Task Performance: Follows directions  Patient Evaluation: Independent - full participation         24-Oct-2017 19:41

## 2023-03-02 NOTE — ED ADULT TRIAGE NOTE - TEMPERATURE IN FAHRENHEIT (DEGREES F)
Patient is a 59 yo female with hx of Pulmonary fibrosis, not on home oxygen, Pulmonary embolism on Eliquis, DM on Lantus and Metformin presents with shortness of breath. Upon evaluation in ED patient found to be hypoxic on 6L NC at 91%, but hemodynamically stable. Labs significant for bnp of 6630 with elevated troponin at 921, downtrended to 649. EKG showing new t wave inversion in leads V3/V4. CTA done showing no PE but Re demonstration of interstitial reticular and ground glass opacities throughout the lungs with a peripheral and basilar predominance and associated traction bronchiectasis suggestive of an interstitial lung disease. Patient admitted to medicine for Acute hypoxic respiratory failure 2/2 ILD flare and right sided heart failure.    98.2

## 2023-05-25 NOTE — ED PROVIDER NOTE - RESPIRATORY, MLM
Skyrizi Counseling: I discussed with the patient the risks of risankizumab-rzaa including but not limited to immunosuppression, and serious infections.  The patient understands that monitoring is required including a PPD at baseline and must alert us or the primary physician if symptoms of infection or other concerning signs are noted. Breath sounds clear and equal bilaterally. Diminished at the bases

## 2023-08-30 NOTE — PHYSICAL THERAPY INITIAL EVALUATION ADULT - TRANSFER TRAINING, PT EVAL
Is This A New Presentation, Or A Follow-Up?: Skin Lesion What Type Of Note Output Would You Prefer (Optional)?: Bullet Format How Severe Is Your Skin Lesion?: moderate Has Your Skin Lesion Been Treated?: not been treated Is This A New Presentation, Or A Follow-Up?: Skin Lesions STG (3-5 sessions) sit to stand/ stand to sit CG

## 2023-11-09 NOTE — DIETITIAN INITIAL EVALUATION ADULT. - WEIGHT CHANGE
SUICIDE RISK ASSESSMENT  YES NO If yes, describe      x Suicide attempt in last 24 hour?      x Suicidal thoughts?      x Plan or considering various methods? If so, Describe:       x Access to means? If so- Specify weapon location       x Indication of substance abuse/dependence?      x Attempts in past?  How many?  Date of most recent:   Method used?       x Any family members, loved ones, friends who committed suicide?      x Recent deaths, losses, anniversary dates?      x Has made preparations for death?      x Lack of support system?    x    Verbal contract for safety?   x    Patient has no current intent,or plan, but agrees to contact provider if suicidal ideation I arises.    x   Patient given emergency 24 hour access information.      VIOLENCE/HOMICIDE POTENTIAL   YES  NO  If yes, describe current or past violence    [ ]  [X]  Threat made to harm or kill someone?   A specific individual? Name:     [ ]  [X]  Access to weapon? Where is weapon?    [x ]  [ ]  History of violence/aggressive behavior to others?    [x ]  [ ]  History of significant damage to property?    [ ]  [X]  Indication of substance abuse/dependence?    [ ]  [X]  Witnessed violence or significant aggression?             no

## 2023-11-10 NOTE — H&P ADULT - PROBLEM SELECTOR PLAN 3
More awake since arriving to ER after IVH  likely due to metabolic encephalopathy due to colitis, UTI, dehydration, less likely intracranial pathology in setting of fall as no overt neuro deficiets  -get ct head r/o occult bleed given fall  -treat UTI, colitis, dehydration  -fall risk Normal volume, rate, productivity, spontaneity and articulation

## 2025-02-11 NOTE — PROVIDER CONTACT NOTE (CRITICAL VALUE NOTIFICATION) - ACTION/TREATMENT ORDERED:
Refill Request - Controlled Substance    CONFIRM preferred pharmacy with the patient.    If Mail Order Rx - Pend for 90 day refill.        Last Seen Department: 12/31/2024  Last Seen by PCP: 12/31/2024    Last Written: 1/13/2025 30 tab 0 refills     Last UDS: 12/31/2024    Med Agreement Signed On: NA    If no future appointment scheduled:  Review the last OV with PCP and review information for follow-up visit,  Route STAFF MESSAGE with patient name to the  Pool for scheduling with the following information:            -  Timing of next visit           -  Visit type ie Physical, OV, etc           -  Diagnoses/Reason ie. COPD, HTN - Do not use MEDICATION, Follow-up or CHECK UP - Give reason for visit        Next Appointment:   No future appointments.    Message sent to  to schedule appt with patient?  NO      Requested Prescriptions     Pending Prescriptions Disp Refills    methylphenidate (CONCERTA) 54 MG extended release tablet 30 tablet 0     Sig: Take 1 tablet by mouth daily for 30 days. Max Daily Amount: 54 mg          KCL